# Patient Record
Sex: MALE | Race: WHITE | NOT HISPANIC OR LATINO | Employment: OTHER | URBAN - METROPOLITAN AREA
[De-identification: names, ages, dates, MRNs, and addresses within clinical notes are randomized per-mention and may not be internally consistent; named-entity substitution may affect disease eponyms.]

---

## 2018-09-12 ENCOUNTER — TELEPHONE (OUTPATIENT)
Dept: INTERNAL MEDICINE CLINIC | Facility: CLINIC | Age: 63
End: 2018-09-12

## 2018-09-12 NOTE — TELEPHONE ENCOUNTER
Patient called requesting an appt      He was last seen 2015, patient states he hasnt seen any other doctors since then  [phone]    Patients states he prefers Thursdays & Friday

## 2018-10-04 ENCOUNTER — TELEPHONE (OUTPATIENT)
Dept: INTERNAL MEDICINE CLINIC | Facility: CLINIC | Age: 63
End: 2018-10-04

## 2018-10-04 ENCOUNTER — OFFICE VISIT (OUTPATIENT)
Dept: INTERNAL MEDICINE CLINIC | Facility: CLINIC | Age: 63
End: 2018-10-04
Payer: COMMERCIAL

## 2018-10-04 VITALS
HEART RATE: 78 BPM | HEIGHT: 73 IN | WEIGHT: 180.4 LBS | RESPIRATION RATE: 14 BRPM | SYSTOLIC BLOOD PRESSURE: 118 MMHG | DIASTOLIC BLOOD PRESSURE: 72 MMHG | BODY MASS INDEX: 23.91 KG/M2

## 2018-10-04 DIAGNOSIS — E55.9 VITAMIN D DEFICIENCY: ICD-10-CM

## 2018-10-04 DIAGNOSIS — R53.83 FATIGUE, UNSPECIFIED TYPE: ICD-10-CM

## 2018-10-04 DIAGNOSIS — E78.5 HYPERLIPIDEMIA, UNSPECIFIED HYPERLIPIDEMIA TYPE: Chronic | ICD-10-CM

## 2018-10-04 DIAGNOSIS — N40.0 BENIGN PROSTATIC HYPERPLASIA, UNSPECIFIED WHETHER LOWER URINARY TRACT SYMPTOMS PRESENT: ICD-10-CM

## 2018-10-04 DIAGNOSIS — F32.A DEPRESSION, UNSPECIFIED DEPRESSION TYPE: Primary | Chronic | ICD-10-CM

## 2018-10-04 PROCEDURE — 99214 OFFICE O/P EST MOD 30 MIN: CPT | Performed by: INTERNAL MEDICINE

## 2018-10-04 RX ORDER — METHYLPHENIDATE HYDROCHLORIDE 10 MG/1
TABLET ORAL
Refills: 0 | COMMUNITY
Start: 2018-08-20 | End: 2022-05-24

## 2018-10-04 RX ORDER — DULOXETIN HYDROCHLORIDE 60 MG/1
90 CAPSULE, DELAYED RELEASE ORAL DAILY
Refills: 4 | COMMUNITY
Start: 2018-08-16 | End: 2022-05-24

## 2018-10-04 RX ORDER — METHYLPHENIDATE HYDROCHLORIDE 5 MG/1
TABLET ORAL
COMMUNITY
Start: 2016-08-09 | End: 2022-05-24

## 2018-10-04 RX ORDER — CLONAZEPAM 0.5 MG/1
TABLET ORAL
Refills: 2 | COMMUNITY
Start: 2018-08-16

## 2018-10-04 RX ORDER — SILDENAFIL CITRATE 50 MG
TABLET ORAL
Refills: 3 | COMMUNITY
Start: 2018-08-16

## 2018-10-04 RX ORDER — NORTRIPTYLINE HYDROCHLORIDE 25 MG/1
100 CAPSULE ORAL
COMMUNITY
End: 2021-04-05

## 2018-10-04 RX ORDER — VENLAFAXINE HYDROCHLORIDE 37.5 MG/1
CAPSULE, EXTENDED RELEASE ORAL
COMMUNITY
Start: 2016-07-18 | End: 2021-04-05

## 2018-10-04 NOTE — TELEPHONE ENCOUNTER
Pt is currently on baby aspirin and   wants to know if should continue on this  Please advise   Thanks     Pt tele: [phone]

## 2018-10-04 NOTE — PROGRESS NOTES
Assessment/Plan:  1  Health maintenance-will receive influenza vaccine outside the office  2  Health maintenance-has a prescription for Adacel vaccine-REVIEW NEXT VISIT  3 -health maintenance-reviewed Shingrix vaccine and patient has prescription  4  Long-term depression-severe  Present intermittently for years  Recently had major depression and required both ECT therapy as well as TMS-currently seeing physician in [address] [place] [address] in stable on Cymbalta and low-dose Abilify  5  Hyperlipidemia-mild-repeat labs at this point  6  Vitamin-D deficiency-for repeat level  7  BPH-minimal symptoms-labs ordered including  8  Seasonal hay fever-monitor  9  History of syncope-vasovagal  10  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-patient sees Dermatology  11  Health maintenance-had colonoscopy approximately 10 years ago-REVIEW NEXT VISIT    MEDICAL REGIMEN CYMBALTA 60 MILLIGRAMS DAILY, ABILIFY 5 MILLIGRAMS-HALF TABLET 3 DAYS PER WEEK,baby aspirin every other day, Cialis 20 milligrams-use as directed    Going for CBC, chemistry profile, PSA, vitamin-D level-appointment in 1 year    Addendum-received note from the patient having ongoing ED which is likely related to his meds  He is on Cymbalta as well as Abilify  Viagra helped to some degree  He wants to try Cialis  He was given a prescription for Cialis 20 milligrams use as directed  Addendum-patient called the office on September 30th stating is trying to get a card for medical marijuana but needs a letter -she wants to uses to treat anxiety  This is not typical therapy for anxiety and has potential for long-term issues  I was going to have a conversation with the patient but he then obtained the letter from his psychiatrist  No problem-specific Assessment & Plan notes found for this encounter         Diagnoses and all orders for this visit:    Depression, unspecified depression type    Hyperlipidemia, unspecified hyperlipidemia type    Other orders  -     VIAGRA 50 MG tablet;   -     aspirin (ASPIRIN LOW DOSE) 81 MG tablet; Take by mouth  -     Cholecalciferol 1000 units capsule; Take 1,000 Units by mouth  -     clonazePAM (KlonoPIN) 0 5 mg tablet;   -     DULoxetine (CYMBALTA) 60 mg delayed release capsule;   -     Omega-3 Fatty Acids (FISH OIL PO); Take 1 g by mouth  -     methylphenidate (RITALIN) 5 mg tablet; 1 in am  -     methylphenidate (RITALIN) 10 mg tablet; Earliest Fill Date: 8/20/18   -     nortriptyline (PAMELOR) 25 mg capsule; Take 100 mg by mouth  -     venlafaxine (EFFEXOR-XR) 37 5 mg 24 hr capsule; TAKE ONE CAPSULE BY MOUTH EVERY MORNING AND TAPER AS DIRECTED          Subjective:      Patient ID: Leoncio Wiley is a 58 y o  male  He has ongoing significant depression -she required ECT therapy as well as [address] [place] -he went through a proloned period of abnormalities= thyroid blood work was normal   He is currently seeing Dr dean in [facility] every couple months and stable on Cymbalta  He exercises on a regular basis  There is an extremely strong family history of depression  His sister has it  Apparent had  He feels that that is in the family of multiple members triggered his current events  He is currently on Cymbalta 60 milligrams daily and low-dose Abilify  He says all medical testing including thyroid blood work etc has been normal     He has a history of BP H  he has minimal symptoms  He does not feel the symptoms are not off warrant therapy  Prior studies had been reviewed and carotid Doppler done previously showed less than 50% stenosis bilaterally with PSA 1 1  LDL is 151  Prior testosterone was normal   He denies strong family history of vascular disease          The following portions of the patient's history were reviewed and updated as appropriate: current medications, past family history, past medical history, past social history, past surgical history and problem list     Review of Systems Constitutional: Negative  Respiratory: Negative  Cardiovascular: Negative  Gastrointestinal: Negative  Endocrine: Negative  Genitourinary: Negative  Musculoskeletal: Negative  Neurological: Negative  Hematological: Negative  Psychiatric/Behavioral: Positive for dysphoric mood  Objective:      /72   Pulse 78   Resp 14   Ht 6' 1" (1 854 m)   Wt 81 8 kg (180 lb 6 4 oz)   BMI 23 80 kg/m²          Physical Exam   Constitutional: He is oriented to person, place, and time  He appears well-developed and well-nourished  No distress  HENT:   Head: Normocephalic and atraumatic  Right Ear: External ear normal    Left Ear: External ear normal    Nose: Nose normal    Mouth/Throat: Oropharynx is clear and moist  No oropharyngeal exudate  Eyes: Pupils are equal, round, and reactive to light  Conjunctivae and EOM are normal  Right eye exhibits no discharge  Left eye exhibits no discharge  No scleral icterus  Neck: No JVD present  No tracheal deviation present  No thyromegaly present  Cardiovascular: Normal rate, regular rhythm, normal heart sounds and intact distal pulses  Exam reveals no gallop and no friction rub  No murmur heard  Pulmonary/Chest: Effort normal and breath sounds normal  No stridor  No respiratory distress  He has no wheezes  He exhibits no tenderness  Abdominal: Bowel sounds are normal  He exhibits no distension and no mass  There is no tenderness  There is no rebound and no guarding  Genitourinary: Rectum normal and penis normal  Rectal exam shows guaiac negative stool  Genitourinary Comments: Minimal enlargement of prostate without nodule   Musculoskeletal: Normal range of motion  He exhibits no edema, tenderness or deformity  Lymphadenopathy:     He has no cervical adenopathy  Neurological: He is alert and oriented to person, place, and time  He has normal reflexes  No cranial nerve deficit  He exhibits normal muscle tone   Coordination normal    Skin: Skin is warm and dry  No rash noted  He is not diaphoretic  No erythema  No pallor  Psychiatric: He has a normal mood and affect   His behavior is normal  Judgment and thought content normal

## 2018-12-11 ENCOUNTER — TELEPHONE (OUTPATIENT)
Dept: INTERNAL MEDICINE CLINIC | Facility: CLINIC | Age: 63
End: 2018-12-11

## 2018-12-11 NOTE — TELEPHONE ENCOUNTER
#[phone] can lm    Is patient following the same directions as he did with the Viagra ? Patient wanted to know about when to take it (right before , 30 minute etc)     Called script into [facility]

## 2018-12-11 NOTE — TELEPHONE ENCOUNTER
----- Message from Martha Gamez MD sent at 12/11/2018 12:37 PM EST -----  Patient dropped a note off at the office regarding her issues with erectile dysfunction and he wanted to know about trying Cialis-call in prescription for Cialis 20 milligrams-use as directed -dispense 6 with 4 refills

## 2019-01-21 ENCOUNTER — TELEPHONE (OUTPATIENT)
Dept: INTERNAL MEDICINE CLINIC | Facility: CLINIC | Age: 64
End: 2019-01-21

## 2019-01-21 NOTE — TELEPHONE ENCOUNTER
Called pharmacy and they said that they had already told him that his insurance doesn't cover Cialis but that they had already told him that Viagra would be cheaper at about $5 a pill  Called pt back to let him know but he didn't  so I left him a message

## 2019-03-27 ENCOUNTER — TELEPHONE (OUTPATIENT)
Dept: INTERNAL MEDICINE CLINIC | Facility: CLINIC | Age: 64
End: 2019-03-27

## 2019-03-27 NOTE — TELEPHONE ENCOUNTER
Pt needs you to write another script for him to get the shingrex vaccine  He lost the one you gave him at his last visit

## 2019-08-01 ENCOUNTER — TELEPHONE (OUTPATIENT)
Dept: INTERNAL MEDICINE CLINIC | Facility: CLINIC | Age: 64
End: 2019-08-01

## 2019-08-01 NOTE — TELEPHONE ENCOUNTER
PT CALLED BACK, I GAVE HIM THE NAMES OF THE DOCTORS, HE STATED THAT HE COULDN'T UNDERSTAND THE MESSAGE THAT WAS LEFT

## 2019-08-01 NOTE — TELEPHONE ENCOUNTER
Dr Gilda Driver wanted to know your recommendation on a [facility]'s doctor who specializes in hip replacement  He gave his cell phone if you want to text or call him  Or you can let me know and I can call him back  Please advise

## 2019-09-30 ENCOUNTER — TELEPHONE (OUTPATIENT)
Dept: INTERNAL MEDICINE CLINIC | Facility: CLINIC | Age: 64
End: 2019-09-30

## 2019-09-30 NOTE — TELEPHONE ENCOUNTER
PT STATED THAT HE IS TRYING TO GET A CARD FOR MEDICAL MARIJUANA BUT NEEDS A LETTER FROM YOU ABOUT HIM HAVING ANXIETY AND NEEDING IT   PLEASE ADVISE

## 2019-09-30 NOTE — TELEPHONE ENCOUNTER
I need to know if this was recommended by his psychiatrist and if he has had any prior treatment with it

## 2019-09-30 NOTE — TELEPHONE ENCOUNTER
SPOKE WITH PT, HE STATED THAT HIS PSYCHIATRIST DID NOT RECOMMEND THIS AND THAT HE'S BEEN ON KLONOPIN FOR IT

## 2019-10-01 NOTE — TELEPHONE ENCOUNTER
I have not written a letter- I need to speak to him-find out times he is available over the next several days

## 2019-10-02 NOTE — TELEPHONE ENCOUNTER
SPOKE WITH PT TO GET TIMES AND HE TOLD ME THAT HE HAD HIS PSYCHIATRIST WRITE HIM A LETTER SO IT'S ALL TAKEN CARE OF

## 2019-10-05 LAB
25(OH)D3 SERPL-MCNC: 27 NG/ML (ref 30–100)
ALBUMIN SERPL-MCNC: 4 G/DL (ref 3.6–5.1)
ALBUMIN/GLOB SERPL: 1.7 (CALC) (ref 1–2.5)
ALP SERPL-CCNC: 50 U/L (ref 40–115)
ALT SERPL-CCNC: 10 U/L (ref 9–46)
AST SERPL-CCNC: 16 U/L (ref 10–35)
BASOPHILS # BLD AUTO: 33 CELLS/UL (ref 0–200)
BASOPHILS NFR BLD AUTO: 0.5 %
BILIRUB SERPL-MCNC: 0.5 MG/DL (ref 0.2–1.2)
BUN SERPL-MCNC: 21 MG/DL (ref 7–25)
BUN/CREAT SERPL: ABNORMAL (CALC) (ref 6–22)
CALCIUM SERPL-MCNC: 9.3 MG/DL (ref 8.6–10.3)
CHLORIDE SERPL-SCNC: 106 MMOL/L (ref 98–110)
CHOLEST SERPL-MCNC: 213 MG/DL
CO2 SERPL-SCNC: 32 MMOL/L (ref 20–32)
CREAT SERPL-MCNC: 0.97 MG/DL (ref 0.7–1.25)
EOSINOPHIL # BLD AUTO: 254 CELLS/UL (ref 15–500)
EOSINOPHIL NFR BLD AUTO: 3.9 %
ERYTHROCYTE [DISTWIDTH] IN BLOOD BY AUTOMATED COUNT: 12.8 % (ref 11–15)
GLOBULIN SER CALC-MCNC: 2.3 G/DL (CALC) (ref 1.9–3.7)
GLUCOSE SERPL-MCNC: 104 MG/DL (ref 65–99)
HCT VFR BLD AUTO: 44 % (ref 38.5–50)
HDLC SERPL-MCNC: 39 MG/DL
HGB BLD-MCNC: 14.2 G/DL (ref 13.2–17.1)
LDLC SERPL DIRECT ASSAY-MCNC: 149 MG/DL
LYMPHOCYTES # BLD AUTO: 1638 CELLS/UL (ref 850–3900)
LYMPHOCYTES NFR BLD AUTO: 25.2 %
MCH RBC QN AUTO: 29.6 PG (ref 27–33)
MCHC RBC AUTO-ENTMCNC: 32.3 G/DL (ref 32–36)
MCV RBC AUTO: 91.7 FL (ref 80–100)
MONOCYTES # BLD AUTO: 605 CELLS/UL (ref 200–950)
MONOCYTES NFR BLD AUTO: 9.3 %
NEUTROPHILS # BLD AUTO: 3972 CELLS/UL (ref 1500–7800)
NEUTROPHILS NFR BLD AUTO: 61.1 %
PLATELET # BLD AUTO: 226 THOUSAND/UL (ref 140–400)
PMV BLD REES-ECKER: 10 FL (ref 7.5–12.5)
POTASSIUM SERPL-SCNC: 4.4 MMOL/L (ref 3.5–5.3)
PROT SERPL-MCNC: 6.3 G/DL (ref 6.1–8.1)
PSA SERPL-MCNC: 1.6 NG/ML
RBC # BLD AUTO: 4.8 MILLION/UL (ref 4.2–5.8)
SL AMB EGFR AFRICAN AMERICAN: 96 ML/MIN/1.73M2
SL AMB EGFR NON AFRICAN AMERICAN: 83 ML/MIN/1.73M2
SODIUM SERPL-SCNC: 143 MMOL/L (ref 135–146)
TRIGL SERPL-MCNC: 128 MG/DL
WBC # BLD AUTO: 6.5 THOUSAND/UL (ref 3.8–10.8)

## 2019-10-10 ENCOUNTER — OFFICE VISIT (OUTPATIENT)
Dept: INTERNAL MEDICINE CLINIC | Facility: CLINIC | Age: 64
End: 2019-10-10
Payer: COMMERCIAL

## 2019-10-10 VITALS
BODY MASS INDEX: 24.09 KG/M2 | SYSTOLIC BLOOD PRESSURE: 110 MMHG | HEIGHT: 73 IN | DIASTOLIC BLOOD PRESSURE: 70 MMHG | HEART RATE: 78 BPM | WEIGHT: 181.8 LBS | RESPIRATION RATE: 14 BRPM

## 2019-10-10 DIAGNOSIS — R73.9 HYPERGLYCEMIA: ICD-10-CM

## 2019-10-10 DIAGNOSIS — E55.9 VITAMIN D DEFICIENCY: ICD-10-CM

## 2019-10-10 DIAGNOSIS — N40.0 BENIGN PROSTATIC HYPERPLASIA WITHOUT LOWER URINARY TRACT SYMPTOMS: ICD-10-CM

## 2019-10-10 DIAGNOSIS — E78.5 HYPERLIPIDEMIA, UNSPECIFIED HYPERLIPIDEMIA TYPE: Chronic | ICD-10-CM

## 2019-10-10 DIAGNOSIS — F32.A DEPRESSION, UNSPECIFIED DEPRESSION TYPE: Primary | Chronic | ICD-10-CM

## 2019-10-10 PROCEDURE — 99214 OFFICE O/P EST MOD 30 MIN: CPT | Performed by: INTERNAL MEDICINE

## 2019-10-10 PROCEDURE — 3008F BODY MASS INDEX DOCD: CPT | Performed by: INTERNAL MEDICINE

## 2019-10-10 NOTE — PROGRESS NOTES
Assessment/Plan:   1  Health maintenance -receiving influenza vaccine  2  Health maintenance -has prescription for Tdap vaccine  3  Health maintenance -has prescription for Shingrix vaccine  4  Hyperglycemia -mild-A1c normal   He is aware blood sugar can be influenced by his use of Abilify  5  Hyperlipidemia -no other risk factors other than brief smoking in the remote past   No family history  He wants to intensify conservative therapy  He is aware this could also be influenced by Abilify  6  Long-term depression -severe - see Psychiatry to regular basis  In the past required both ECT therapy  Currently on therapy with transcranial magnetic stimulation -he sees Dr Chela Ramos in [facility] a remains on Cymbalta at low-dose Abilify  7  Vitamin-D deficiency -she will initiate 2000 units daily  8  BPH-minimal symptoms -monitor  9  Seasonal hay fever-monitor  10  History of syncope -vasovagal  11  General care -she will be scheduled follow-up colonoscopy     medical regimen -Cymbalta 60 milligrams daily, Abilify 5 milligrams daily, baby aspirin every other day, Cialis 20 milligrams -use as directed , vitamin D3/ 2000 units a day    Appointment in 1 year with prior chemistry profile, CBC, cholesterol profile, vitamin-D level, PSA  No problem-specific Assessment & Plan notes found for this encounter  Diagnoses and all orders for this visit:    Depression, unspecified depression type    Hyperlipidemia, unspecified hyperlipidemia type  -     CBC and differential; Future  -     Comprehensive metabolic panel; Future  -     Cholesterol, total; Future  -     HEMOGLOBIN A1C W/ EAG ESTIMATION; Future  -     HDL cholesterol; Future  -     LDL cholesterol, direct; Future  -     Triglycerides; Future  -     PSA Total, Diagnostic; Future  -     Vitamin D 25 hydroxy; Future    Hyperglycemia  -     CBC and differential; Future  -     Comprehensive metabolic panel;  Future  -     Cholesterol, total; Future  -     HEMOGLOBIN A1C W/ EAG ESTIMATION; Future  -     HDL cholesterol; Future  -     LDL cholesterol, direct; Future  -     Triglycerides; Future  -     PSA Total, Diagnostic; Future  -     Vitamin D 25 hydroxy; Future    Vitamin D deficiency  -     Vitamin D 25 hydroxy; Future    Benign prostatic hyperplasia without lower urinary tract symptoms  -     PSA Total, Diagnostic; Future          Subjective:      Patient ID: Tg Burgos is a 61 y o  male  Reviewed several health maintenance issues  He is due for follow-up colonoscopy  Reviewed the literature regarding cologuard colonoscopy and he will be proceeding with colonoscopy  Donald Phillip He will be receiving influenza vaccine  He has a prescription for Tdap  He will also be receiving the zoster vaccine  He continues to struggle with his depression  He remains on duloxetine plus Abilify  He says it is partially effective  He is still being treated with [address] [place]- he is due to complete excellent  He sees a psychiatrist every several weeks      Results for orders placed or performed in visit on 10/04/19  -LDL cholesterol, direct       Result                      Value             Ref Range           LDL Cholesterol             149 (H)           <100 mg/dL     -Cholesterol, total       Result                      Value             Ref Range           Total Cholesterol           213 (H)           <200 mg/dL     -Triglycerides       Result                      Value             Ref Range           Triglycerides               128               <150 mg/dL     -Comprehensive metabolic panel       Result                      Value             Ref Range           Glucose, Random             104 (H)           65 - 99 mg/dL       BUN                         21                7 - 25 mg/dL        Creatinine                  0 97              0 70 - 1 25 *       eGFR Non  Ameri*     83                > OR = 60 mL*       eGFR        96                > OR = 60 mL*       SL AMB BUN/CREATININE *                       6 - 22 (calc)   NOT APPLICABLE       Sodium                      143               135 - 146 mm*       Potassium                   4 4               3 5 - 5 3 mm*       Chloride                    106               98 - 110 mmo*       CO2                         32                20 - 32 mmol*       SL AMB CALCIUM              9 3               8 6 - 10 3 m*       Protein, Total              6 3               6 1 - 8 1 g/*       Albumin                     4 0               3 6 - 5 1 g/*       Globulin                    2 3               1 9 - 3 7 g/*       Albumin/Globulin Ratio      1 7               1 0 - 2 5 (c*       TOTAL BILIRUBIN             0 5               0 2 - 1 2 mg*       Alkaline Phosphatase        50                40 - 115 U/L        AST                         16                10 - 35 U/L         ALT                         10                9 - 46 U/L     -HDL cholesterol       Result                      Value             Ref Range           HDL                         39 (L)            >40 mg/dL      -CBC and differential       Result                      Value             Ref Range           White Blood Cell Count      6 5               3 8 - 10 8 T*       Red Blood Cell Count        4 80              4 20 - 5 80 *       Hemoglobin                  14 2              13 2 - 17 1 *       HCT                         44 0              38 5 - 50 0 %       MCV                         91 7              80 0 - 100 0*       MCH                         29 6              27 0 - 33 0 *       MCHC                        32 3              32 0 - 36 0 *       RDW                         12 8              11 0 - 15 0 %       Platelet Count              226               140 - 400 Th*       SL AMB MPV                  10 0              7 5 - 12 5 fL       Neutrophils (Absolute)      3,972             1,500 - 7,80*       Lymphocytes (Absolute)      1,638             850 - 3,900 *       Monocytes (Absolute)        605               200 - 950 ce*       Eosinophils (Absolute)      254               15 - 500 boris*       Basophils ABS               33                0 - 200 cell*       Neutrophils                 61 1              %                   Lymphocytes                 25 2              %                   Monocytes                   9 3               %                   Eosinophils                 3 9               %                   Basophils PCT               0 5               %              -PSA, Total Screen       Result                      Value             Ref Range           Prostate Specific Anti*     1 6               < OR = 4 0 n*  -Vitamin D 25 hydroxy       Result                      Value             Ref Range           Vitamin D, 25-Hydroxy,*     27 (L)            30 - 100 ng/*    He has mild hyperglycemia but an A1c done in the office is normal   In reference to his hyperlipidemia he was a smoker the remote past for 8 years  Denies any family history of vascular disease  He is trying to intensify conservative measures  We reviewed that use of Abilify can affect his lipids as well as his blood sugar  He sees Dermatology periodically with his history of skin lesions  He had recent full skin exam    In reference to his vitamin-D deficiency he will begin vitamin D3/ 2000 units a day  This patient denies any systemic symptoms  Specifically there has been no evidence of fever, night sweats, significant weight loss or significant decrease in appetite  collected 0 8 with hemanth leone      The following portions of the patient's history were reviewed and updated as appropriate: allergies, current medications, past family history, past medical history, past social history, past surgical history and problem list     Review of Systems   Constitutional: Negative  Respiratory: Negative  Cardiovascular: Negative  Gastrointestinal: Negative      Endocrine: Negative  Genitourinary: Negative  Musculoskeletal: Negative  Neurological: Negative  Hematological: Negative  Psychiatric/Behavioral: Positive for dysphoric mood  Objective:      Ht 6' 1" (1 854 m)   Wt 82 5 kg (181 lb 12 8 oz)   BMI 23 99 kg/m²          Physical Exam   Constitutional: He is oriented to person, place, and time  He appears well-developed and well-nourished  No distress  HENT:   Head: Normocephalic and atraumatic  Right Ear: External ear normal    Left Ear: External ear normal    Nose: Nose normal    Mouth/Throat: Oropharynx is clear and moist  No oropharyngeal exudate  Eyes: Pupils are equal, round, and reactive to light  Conjunctivae and EOM are normal  Right eye exhibits no discharge  Left eye exhibits no discharge  No scleral icterus  Neck: No JVD present  No tracheal deviation present  No thyromegaly present  Cardiovascular: Normal rate, regular rhythm, normal heart sounds and intact distal pulses  Exam reveals no gallop and no friction rub  No murmur heard  Pulmonary/Chest: Effort normal and breath sounds normal  No stridor  No respiratory distress  He has no wheezes  He exhibits no tenderness  Abdominal: Bowel sounds are normal  He exhibits no distension and no mass  There is no tenderness  There is no rebound and no guarding  Genitourinary: Rectum normal, prostate normal and penis normal  Rectal exam shows guaiac negative stool  Musculoskeletal: Normal range of motion  He exhibits no edema, tenderness or deformity  Lymphadenopathy:     He has no cervical adenopathy  Neurological: He is alert and oriented to person, place, and time  He has normal reflexes  He displays normal reflexes  No cranial nerve deficit  He exhibits normal muscle tone  Coordination normal    Skin: Skin is warm and dry  No rash noted  He is not diaphoretic  No erythema  No pallor  Psychiatric: He has a normal mood and affect   His behavior is normal  Judgment and thought content normal

## 2020-12-28 ENCOUNTER — IMMUNIZATIONS (OUTPATIENT)
Dept: FAMILY MEDICINE CLINIC | Facility: HOSPITAL | Age: 65
End: 2020-12-28
Payer: COMMERCIAL

## 2020-12-28 DIAGNOSIS — Z23 ENCOUNTER FOR IMMUNIZATION: ICD-10-CM

## 2020-12-28 PROCEDURE — 91301 SARS-COV-2 / COVID-19 MRNA VACCINE (MODERNA) 100 MCG: CPT

## 2020-12-28 PROCEDURE — 0011A SARS-COV-2 / COVID-19 MRNA VACCINE (MODERNA) 100 MCG: CPT

## 2021-01-24 ENCOUNTER — IMMUNIZATIONS (OUTPATIENT)
Dept: FAMILY MEDICINE CLINIC | Facility: HOSPITAL | Age: 66
End: 2021-01-24

## 2021-01-24 DIAGNOSIS — Z23 ENCOUNTER FOR IMMUNIZATION: Primary | ICD-10-CM

## 2021-01-24 PROCEDURE — 0012A SARS-COV-2 / COVID-19 MRNA VACCINE (MODERNA) 100 MCG: CPT

## 2021-01-24 PROCEDURE — 91301 SARS-COV-2 / COVID-19 MRNA VACCINE (MODERNA) 100 MCG: CPT

## 2021-04-05 ENCOUNTER — OFFICE VISIT (OUTPATIENT)
Dept: INTERNAL MEDICINE CLINIC | Facility: CLINIC | Age: 66
End: 2021-04-05
Payer: COMMERCIAL

## 2021-04-05 VITALS
OXYGEN SATURATION: 98 % | DIASTOLIC BLOOD PRESSURE: 68 MMHG | RESPIRATION RATE: 18 BRPM | WEIGHT: 177 LBS | BODY MASS INDEX: 23.98 KG/M2 | TEMPERATURE: 98 F | HEART RATE: 75 BPM | SYSTOLIC BLOOD PRESSURE: 132 MMHG | HEIGHT: 72 IN

## 2021-04-05 DIAGNOSIS — I65.23 CAROTID STENOSIS, ASYMPTOMATIC, BILATERAL: ICD-10-CM

## 2021-04-05 DIAGNOSIS — F32.A DEPRESSION, UNSPECIFIED DEPRESSION TYPE: Primary | ICD-10-CM

## 2021-04-05 DIAGNOSIS — Z13.1 SCREENING FOR DIABETES MELLITUS: ICD-10-CM

## 2021-04-05 DIAGNOSIS — N40.0 BENIGN PROSTATIC HYPERPLASIA WITHOUT LOWER URINARY TRACT SYMPTOMS: ICD-10-CM

## 2021-04-05 DIAGNOSIS — Z13.29 SCREENING FOR THYROID DISORDER: ICD-10-CM

## 2021-04-05 DIAGNOSIS — Z13.220 SCREENING FOR HYPERLIPIDEMIA: ICD-10-CM

## 2021-04-05 DIAGNOSIS — E78.5 HYPERLIPIDEMIA, UNSPECIFIED HYPERLIPIDEMIA TYPE: ICD-10-CM

## 2021-04-05 DIAGNOSIS — E55.9 VITAMIN D DEFICIENCY: ICD-10-CM

## 2021-04-05 PROCEDURE — 99214 OFFICE O/P EST MOD 30 MIN: CPT | Performed by: INTERNAL MEDICINE

## 2021-04-05 PROCEDURE — 3725F SCREEN DEPRESSION PERFORMED: CPT | Performed by: INTERNAL MEDICINE

## 2021-04-05 PROCEDURE — 3288F FALL RISK ASSESSMENT DOCD: CPT | Performed by: INTERNAL MEDICINE

## 2021-04-05 PROCEDURE — 1160F RVW MEDS BY RX/DR IN RCRD: CPT | Performed by: INTERNAL MEDICINE

## 2021-04-05 PROCEDURE — 1101F PT FALLS ASSESS-DOCD LE1/YR: CPT | Performed by: INTERNAL MEDICINE

## 2021-04-05 PROCEDURE — 1036F TOBACCO NON-USER: CPT | Performed by: INTERNAL MEDICINE

## 2021-04-05 PROCEDURE — 3008F BODY MASS INDEX DOCD: CPT | Performed by: INTERNAL MEDICINE

## 2021-04-05 RX ORDER — LAMOTRIGINE 100 MG/1
100 TABLET ORAL DAILY
COMMUNITY
End: 2022-05-24

## 2021-04-05 RX ORDER — ARIPIPRAZOLE 5 MG/1
2.5 TABLET ORAL DAILY
COMMUNITY
End: 2022-05-24

## 2021-04-05 NOTE — PROGRESS NOTES
Assessment/Plan:    #Depression  -seeing psychiatry Dr Margi Raphale  -no on lamictal with plans to possibly wean off abilify, klonopin, ritalin, cymbalta  -previous history of transcranial magnetic stimulation and ECT therapy, reports he felt foggy minded and forgetful after ECT    #Vitamin D Deficiency  -no longer taking vitamin D    #BPH  -has urinary urgency but denies nocturia or frequency    #Family History  -mother with HLD  -patient on aspirin  -awaiting labs  -obtain carotid US    #Erectile Dysfunction  -uses viagra prn    #Basal Cell  -seeing dermatology for annual skin check    #Health Maintenance  -routine labs nad followup 6 months  -colonoscopy with Dr Theodore Cardozo pending  -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  -covid vaccine up to date  -will obtain TDAP and shingrix at [place] [id number] 4/28/22 PSA elevated at 4 4, repeat 5 and saw urology at [facility]  Will start on ibuprofen for possible prostatis and repeat 4kscore in 4 weeks and if abnormal then MRI prostate  No problem-specific Assessment & Plan notes found for this encounter         Diagnoses and all orders for this visit:    Depression, unspecified depression type  -     CBC and differential  -     Comprehensive metabolic panel    Hyperlipidemia, unspecified hyperlipidemia type  -     CBC and differential  -     Comprehensive metabolic panel  -     Lipid Panel With Direct LDL    Benign prostatic hyperplasia without lower urinary tract symptoms  -     CBC and differential  -     Comprehensive metabolic panel  -     PSA, total and free    Vitamin D deficiency  -     CBC and differential  -     Comprehensive metabolic panel  -     Vitamin D 25 hydroxy    Screening for diabetes mellitus  -     CBC and differential  -     Hemoglobin A1C  -     Comprehensive metabolic panel    Screening for hyperlipidemia  -     CBC and differential  -     Comprehensive metabolic panel    Screening for thyroid disorder  -     CBC and differential  -     TSH, 3rd generation with Free T4 reflex  -     Comprehensive metabolic panel    Carotid stenosis, asymptomatic, bilateral  -     CBC and differential  -     Comprehensive metabolic panel  -     VAS carotid complete study; Future    Other orders  -     lamoTRIgine (LaMICtal) 100 mg tablet; Take 100 mg by mouth daily  -     ARIPiprazole (ABILIFY) 5 mg tablet; Take 2 5 mg by mouth daily            Current Outpatient Medications:     ARIPiprazole (ABILIFY) 5 mg tablet, Take 2 5 mg by mouth daily, Disp: , Rfl:     aspirin (ASPIRIN LOW DOSE) 81 MG tablet, Take 1 tablet by mouth 3 (three) times a week, Disp: , Rfl:     clonazePAM (KlonoPIN) 0 5 mg tablet, , Disp: , Rfl: 2    DULoxetine (CYMBALTA) 60 mg delayed release capsule, Take 90 mg by mouth daily, Disp: , Rfl: 4    lamoTRIgine (LaMICtal) 100 mg tablet, Take 100 mg by mouth daily, Disp: , Rfl:     methylphenidate (RITALIN) 10 mg tablet, , Disp: , Rfl: 0    methylphenidate (RITALIN) 5 mg tablet, 1 in am, Disp: , Rfl:     VIAGRA 50 MG tablet, , Disp: , Rfl: 3    Subjective:      Patient ID: Beryle Grayer is a 72 y o  male  HPI    Patient presents for routine checkup  Denies any recent hospitalizations or surgeries  States that he continues to follow-up with psychiatry for depression  He is currently taking Abilify, Cymbalta, Lamictal, methylphenidate  He is due for colonoscopy and will schedule  He has BPH with urinary urgency  He defers starting any alpha blockers  We will obtain routine labs today  He is due for the Tdap and Shingrix vaccine however he will obtain it at [facility]  He reports a family history of mother with hyperlipidemia however denies any coronary artery disease or stroke  He continues to take aspirin  We will obtain a carotid ultrasound  He will call back if he is interested in an exercise stress test   Patient will return to care in 1 year      The following portions of the patient's history were reviewed and updated as appropriate: allergies, current medications, past family history, past medical history, past social history, past surgical history and problem list     Review of Systems   Constitutional: Negative for activity change, appetite change, fatigue and fever  HENT: Negative for congestion, ear pain, hearing loss, sore throat and tinnitus  Eyes: Negative for photophobia, pain and visual disturbance  Respiratory: Negative for cough, shortness of breath and wheezing  Cardiovascular: Negative for chest pain, palpitations and leg swelling  Gastrointestinal: Negative for abdominal distention, abdominal pain, constipation, diarrhea, nausea and vomiting  Genitourinary: Negative for difficulty urinating, frequency and hematuria  Musculoskeletal: Negative for arthralgias, back pain, gait problem, joint swelling, myalgias, neck pain and neck stiffness  Skin: Negative for color change, pallor, rash and wound  Neurological: Negative for dizziness, tremors, numbness and headaches  Hematological: Does not bruise/bleed easily  Psychiatric/Behavioral: Positive for decreased concentration and dysphoric mood  Objective:      /68 (BP Location: Left arm, Patient Position: Sitting, Cuff Size: Standard)   Pulse 75   Temp 98 °F (36 7 °C) (Tympanic)   Resp 18   Ht 6' (1 829 m)   Wt 80 3 kg (177 lb)   SpO2 98%   BMI 24 01 kg/m²          Physical Exam  Vitals signs reviewed  Constitutional:       Appearance: He is well-developed  HENT:      Head: Normocephalic and atraumatic  Right Ear: Tympanic membrane, ear canal and external ear normal  There is no impacted cerumen  Left Ear: Tympanic membrane, ear canal and external ear normal  There is no impacted cerumen  Eyes:      Conjunctiva/sclera: Conjunctivae normal       Pupils: Pupils are equal, round, and reactive to light  Neck:      Musculoskeletal: Normal range of motion and neck supple        Thyroid: No thyromegaly  Vascular: No JVD  Cardiovascular:      Rate and Rhythm: Normal rate and regular rhythm  Pulses: Normal pulses  Heart sounds: Normal heart sounds  No murmur  Pulmonary:      Effort: Pulmonary effort is normal  No respiratory distress  Breath sounds: Normal breath sounds  No stridor  No wheezing, rhonchi or rales  Abdominal:      General: Bowel sounds are normal  There is no distension  Palpations: Abdomen is soft  There is no mass  Tenderness: There is no abdominal tenderness  There is no right CVA tenderness, left CVA tenderness, guarding or rebound  Musculoskeletal: Normal range of motion  General: No tenderness or deformity  Right lower leg: No edema  Left lower leg: No edema  Lymphadenopathy:      Cervical: No cervical adenopathy  Skin:     General: Skin is warm and dry  Findings: No erythema or rash  Neurological:      Mental Status: He is alert and oriented to person, place, and time  Deep Tendon Reflexes: Reflexes are normal and symmetric  This note was completed in part utilizing [employer] direct voice recognition software  Grammatical errors, random word insertion, spelling mistakes, and incomplete sentences may be an occasional consequence of the system secondary to software limitations, ambient noise and hardware issues  At the time of dictation, efforts were made to edit, clarify and /or correct errors  Please read the chart carefully and recognize, using context, where substitutions have occurred  If you have any questions or concerns about the context, text or information contained within the body of this dictation, please contact myself, the provider, for further clarification

## 2021-04-20 ENCOUNTER — HOSPITAL ENCOUNTER (OUTPATIENT)
Dept: NON INVASIVE DIAGNOSTICS | Facility: HOSPITAL | Age: 66
Discharge: HOME | End: 2021-04-20
Payer: COMMERCIAL

## 2021-04-20 ENCOUNTER — APPOINTMENT (OUTPATIENT)
Dept: LAB | Facility: HOSPITAL | Age: 66
End: 2021-04-20
Payer: COMMERCIAL

## 2021-04-20 DIAGNOSIS — F03.90 PRESENILE DEMENTIA WITH DEPRESSION WITHOUT BEHAVIORAL DISTURBANCE (HCC): Primary | ICD-10-CM

## 2021-04-20 DIAGNOSIS — I65.23 CAROTID STENOSIS, ASYMPTOMATIC, BILATERAL: ICD-10-CM

## 2021-04-20 DIAGNOSIS — F32.A PRESENILE DEMENTIA WITH DEPRESSION WITHOUT BEHAVIORAL DISTURBANCE (HCC): Primary | ICD-10-CM

## 2021-04-20 LAB
25(OH)D3 SERPL-MCNC: 27.3 NG/ML (ref 30–100)
ALBUMIN SERPL BCP-MCNC: 4 G/DL (ref 3.5–5)
ALP SERPL-CCNC: 67 U/L (ref 46–116)
ALT SERPL W P-5'-P-CCNC: 16 U/L (ref 12–78)
ANION GAP SERPL CALCULATED.3IONS-SCNC: 7 MMOL/L (ref 4–13)
AST SERPL W P-5'-P-CCNC: 16 U/L (ref 5–45)
BASOPHILS # BLD AUTO: 0.02 THOUSANDS/ΜL (ref 0–0.1)
BASOPHILS NFR BLD AUTO: 0 % (ref 0–1)
BILIRUB SERPL-MCNC: 0.43 MG/DL (ref 0.2–1)
BUN SERPL-MCNC: 18 MG/DL (ref 5–25)
CALCIUM SERPL-MCNC: 9.2 MG/DL (ref 8.3–10.1)
CHLORIDE SERPL-SCNC: 105 MMOL/L (ref 100–108)
CHOLEST SERPL-MCNC: 198 MG/DL (ref 50–200)
CO2 SERPL-SCNC: 31 MMOL/L (ref 21–32)
CREAT SERPL-MCNC: 1.06 MG/DL (ref 0.6–1.3)
EOSINOPHIL # BLD AUTO: 0.13 THOUSAND/ΜL (ref 0–0.61)
EOSINOPHIL NFR BLD AUTO: 2 % (ref 0–6)
ERYTHROCYTE [DISTWIDTH] IN BLOOD BY AUTOMATED COUNT: 12.6 % (ref 11.6–15.1)
EST. AVERAGE GLUCOSE BLD GHB EST-MCNC: 114 MG/DL
GFR SERPL CREATININE-BSD FRML MDRD: 73 ML/MIN/1.73SQ M
GLUCOSE SERPL-MCNC: 97 MG/DL (ref 65–140)
HBA1C MFR BLD: 5.6 %
HCT VFR BLD AUTO: 44.2 % (ref 36.5–49.3)
HDLC SERPL-MCNC: 43 MG/DL
HGB BLD-MCNC: 14.4 G/DL (ref 12–17)
IMM GRANULOCYTES # BLD AUTO: 0.02 THOUSAND/UL (ref 0–0.2)
IMM GRANULOCYTES NFR BLD AUTO: 0 % (ref 0–2)
LDLC SERPL CALC-MCNC: 135 MG/DL (ref 0–100)
LYMPHOCYTES # BLD AUTO: 1.39 THOUSANDS/ΜL (ref 0.6–4.47)
LYMPHOCYTES NFR BLD AUTO: 22 % (ref 14–44)
MCH RBC QN AUTO: 29.8 PG (ref 26.8–34.3)
MCHC RBC AUTO-ENTMCNC: 32.6 G/DL (ref 31.4–37.4)
MCV RBC AUTO: 92 FL (ref 82–98)
MONOCYTES # BLD AUTO: 0.5 THOUSAND/ΜL (ref 0.17–1.22)
MONOCYTES NFR BLD AUTO: 8 % (ref 4–12)
NEUTROPHILS # BLD AUTO: 4.23 THOUSANDS/ΜL (ref 1.85–7.62)
NEUTS SEG NFR BLD AUTO: 68 % (ref 43–75)
NRBC BLD AUTO-RTO: 0 /100 WBCS
PLATELET # BLD AUTO: 239 THOUSANDS/UL (ref 149–390)
PMV BLD AUTO: 9.3 FL (ref 8.9–12.7)
POTASSIUM SERPL-SCNC: 4.8 MMOL/L (ref 3.5–5.3)
PROT SERPL-MCNC: 6.6 G/DL (ref 6.4–8.2)
RBC # BLD AUTO: 4.83 MILLION/UL (ref 3.88–5.62)
SODIUM SERPL-SCNC: 143 MMOL/L (ref 136–145)
TRIGL SERPL-MCNC: 101 MG/DL
TSH SERPL DL<=0.05 MIU/L-ACNC: 2.32 UIU/ML (ref 0.36–3.74)
WBC # BLD AUTO: 6.29 THOUSAND/UL (ref 4.31–10.16)

## 2021-04-20 PROCEDURE — 36415 COLL VENOUS BLD VENIPUNCTURE: CPT | Performed by: INTERNAL MEDICINE

## 2021-04-20 PROCEDURE — 83036 HEMOGLOBIN GLYCOSYLATED A1C: CPT | Performed by: INTERNAL MEDICINE

## 2021-04-20 PROCEDURE — 82306 VITAMIN D 25 HYDROXY: CPT | Performed by: INTERNAL MEDICINE

## 2021-04-20 PROCEDURE — 85025 COMPLETE CBC W/AUTO DIFF WBC: CPT | Performed by: INTERNAL MEDICINE

## 2021-04-20 PROCEDURE — 84153 ASSAY OF PSA TOTAL: CPT | Performed by: INTERNAL MEDICINE

## 2021-04-20 PROCEDURE — 93880 EXTRACRANIAL BILAT STUDY: CPT

## 2021-04-20 PROCEDURE — 80053 COMPREHEN METABOLIC PANEL: CPT | Performed by: INTERNAL MEDICINE

## 2021-04-20 PROCEDURE — 93880 EXTRACRANIAL BILAT STUDY: CPT | Performed by: SURGERY

## 2021-04-20 PROCEDURE — 84443 ASSAY THYROID STIM HORMONE: CPT | Performed by: INTERNAL MEDICINE

## 2021-04-20 PROCEDURE — 80061 LIPID PANEL: CPT

## 2021-04-20 PROCEDURE — 84154 ASSAY OF PSA FREE: CPT | Performed by: INTERNAL MEDICINE

## 2021-04-21 ENCOUNTER — TELEPHONE (OUTPATIENT)
Dept: INTERNAL MEDICINE CLINIC | Facility: CLINIC | Age: 66
End: 2021-04-21

## 2021-04-21 DIAGNOSIS — E78.5 HYPERLIPIDEMIA, UNSPECIFIED HYPERLIPIDEMIA TYPE: Primary | ICD-10-CM

## 2021-04-21 NOTE — TELEPHONE ENCOUNTER
LEFT DETAILED MESSAGE FOR PT, TOLD HIM TO CALL BACK IF HE WANTED TO TALK ABOUT STARTING A CHOLESTEROL MEDICATION OR IF HE HAS ANY QUESTIONS    I ALSO LET HIM KNOW THAT I PUT THE LABS LIP IN THE MAIL TO BE DONE NEXT YEAR

## 2021-04-21 NOTE — TELEPHONE ENCOUNTER
----- Message from Dario Hyatt DO sent at 4/21/2021  2:13 PM EDT -----  Please let Dr Tung Wolf know that his labs were good except for the cholesterol  His LDL is 135  He needs to diet and exercise to bring this below 100  We can consider statin medication if he is interested  His carotid US reveals <50% stenosis over b/l   carotids but the plaque is smooth and can be monitored off a statin if he prefers  We will continue to monitor his lipids next year  His vitamin D was low at 27, he should take 2000 units vitamin D daily

## 2021-04-21 NOTE — TELEPHONE ENCOUNTER
Voicemail message left for theresa informing him that cartoid US exact percentages below 50 are not listed  Vascular reports the percent as less than 50 and that it is the same as in 2015  He will continue with aspirin

## 2021-04-21 NOTE — TELEPHONE ENCOUNTER
PT CALLED BACK, SAID HE'D LIKE TO SPEAK WITH YOU TO GET MORE DETAILS ON HIS CAROTID   HE'S ASKING FOR THE EXACT PERCENTAGE     PLEASE ADVISE

## 2021-04-22 LAB
PSA FREE MFR SERPL: 27.6 %
PSA FREE SERPL-MCNC: 0.58 NG/ML
PSA SERPL-MCNC: 2.1 NG/ML (ref 0–4)

## 2022-03-04 ENCOUNTER — TELEPHONE (OUTPATIENT)
Dept: DERMATOLOGY | Facility: CLINIC | Age: 67
End: 2022-03-04

## 2022-03-04 NOTE — TELEPHONE ENCOUNTER
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

## 2022-04-01 ENCOUNTER — TELEPHONE (OUTPATIENT)
Dept: INTERNAL MEDICINE CLINIC | Facility: CLINIC | Age: 67
End: 2022-04-01

## 2022-04-01 NOTE — TELEPHONE ENCOUNTER
Called pt to confirm mondays appt but he r/s due to no labs being done and he goes to [facility]  He wants to know if you can add PSA, vitamin D to his labs  -I will mail out his lab slip altogether

## 2022-04-19 ENCOUNTER — TELEPHONE (OUTPATIENT)
Dept: INTERNAL MEDICINE CLINIC | Facility: CLINIC | Age: 67
End: 2022-04-19

## 2022-04-22 ENCOUNTER — APPOINTMENT (OUTPATIENT)
Dept: LAB | Facility: HOSPITAL | Age: 67
End: 2022-04-22
Payer: COMMERCIAL

## 2022-04-22 DIAGNOSIS — Z79.899 ENCOUNTER FOR LONG-TERM (CURRENT) USE OF OTHER MEDICATIONS: ICD-10-CM

## 2022-04-22 DIAGNOSIS — N40.0 BENIGN PROSTATIC HYPERPLASIA, UNSPECIFIED WHETHER LOWER URINARY TRACT SYMPTOMS PRESENT: Primary | ICD-10-CM

## 2022-04-22 LAB
CHOLEST SERPL-MCNC: 190 MG/DL
HDLC SERPL-MCNC: 37 MG/DL
LDLC SERPL CALC-MCNC: 136 MG/DL (ref 0–100)
NONHDLC SERPL-MCNC: 153 MG/DL
TRIGL SERPL-MCNC: 84 MG/DL

## 2022-04-22 PROCEDURE — 80175 DRUG SCREEN QUAN LAMOTRIGINE: CPT

## 2022-04-22 PROCEDURE — 80061 LIPID PANEL: CPT

## 2022-04-24 ENCOUNTER — TELEPHONE (OUTPATIENT)
Dept: INTERNAL MEDICINE CLINIC | Facility: CLINIC | Age: 67
End: 2022-04-24

## 2022-04-24 DIAGNOSIS — R97.20 ELEVATED PSA: Primary | ICD-10-CM

## 2022-04-24 NOTE — TELEPHONE ENCOUNTER
Patient calling stating he is calling back to speak with Dr Chamorro Early regarding his test results left on voicemail  Paged  advising that the patient did receive vm and to call if needed

## 2022-04-24 NOTE — TELEPHONE ENCOUNTER
Voicemail message left informing patient of positive PSA elevation  Will repeat PSA in May and avoid any sex or activity that will stimulate the prostate 48 hours beforehand

## 2022-04-25 ENCOUNTER — APPOINTMENT (OUTPATIENT)
Dept: LAB | Facility: HOSPITAL | Age: 67
End: 2022-04-25
Payer: COMMERCIAL

## 2022-04-25 DIAGNOSIS — R97.20 ELEVATED PSA: ICD-10-CM

## 2022-04-25 LAB — LAMOTRIGINE SERPL-MCNC: 5.8 UG/ML (ref 2–20)

## 2022-04-25 PROCEDURE — 84154 ASSAY OF PSA FREE: CPT

## 2022-04-25 PROCEDURE — 84153 ASSAY OF PSA TOTAL: CPT

## 2022-04-25 PROCEDURE — 36415 COLL VENOUS BLD VENIPUNCTURE: CPT

## 2022-04-27 LAB
PSA FREE MFR SERPL: 21.8 %
PSA FREE SERPL-MCNC: 1.09 NG/ML
PSA SERPL-MCNC: 5 NG/ML (ref 0–4)

## 2022-04-28 ENCOUNTER — TELEPHONE (OUTPATIENT)
Dept: INTERNAL MEDICINE CLINIC | Facility: CLINIC | Age: 67
End: 2022-04-28

## 2022-04-28 NOTE — TELEPHONE ENCOUNTER
Ninfa Arce called and is asking if you can prescribe 800 mg of ibuprofen every 8 hours per your conversation instead of taking multiple tablets  He mentioned it should be for 2 weeks possibly with a refill  [facility] [address]    He also said he started getting heartburn with the 800 mg  Can he take tums or advise to help with the heartburn?

## 2022-04-28 NOTE — TELEPHONE ENCOUNTER
----- Message from Frances Mantilla DO sent at 4/28/2022  7:31 AM EDT -----  Please let patient know his PSA is now up to 5, he should see urology for an evaluation

## 2022-05-09 ENCOUNTER — TELEPHONE (OUTPATIENT)
Dept: OTHER | Facility: OTHER | Age: 67
End: 2022-05-09

## 2022-05-09 NOTE — TELEPHONE ENCOUNTER
patient is requesting a TSH level and Free t4 lab script, his endocrinologist would like for him to repeat in 3 months  Please advise when that has been ordered   Also, if possible, please email the scripts to the email on file

## 2022-05-24 ENCOUNTER — OFFICE VISIT (OUTPATIENT)
Dept: INTERNAL MEDICINE CLINIC | Facility: CLINIC | Age: 67
End: 2022-05-24
Payer: COMMERCIAL

## 2022-05-24 VITALS
OXYGEN SATURATION: 99 % | HEART RATE: 65 BPM | RESPIRATION RATE: 14 BRPM | SYSTOLIC BLOOD PRESSURE: 124 MMHG | WEIGHT: 168.6 LBS | DIASTOLIC BLOOD PRESSURE: 84 MMHG | HEIGHT: 72 IN | BODY MASS INDEX: 22.84 KG/M2

## 2022-05-24 DIAGNOSIS — E03.9 HYPOTHYROIDISM, UNSPECIFIED TYPE: ICD-10-CM

## 2022-05-24 DIAGNOSIS — Z00.00 MEDICARE ANNUAL WELLNESS VISIT, INITIAL: ICD-10-CM

## 2022-05-24 DIAGNOSIS — E78.5 HYPERLIPIDEMIA, UNSPECIFIED HYPERLIPIDEMIA TYPE: ICD-10-CM

## 2022-05-24 DIAGNOSIS — F33.9 DEPRESSION, RECURRENT (HCC): ICD-10-CM

## 2022-05-24 DIAGNOSIS — Z12.11 SCREENING FOR COLON CANCER: ICD-10-CM

## 2022-05-24 DIAGNOSIS — Z23 ENCOUNTER FOR IMMUNIZATION: ICD-10-CM

## 2022-05-24 DIAGNOSIS — R97.20 ELEVATED PSA: Primary | ICD-10-CM

## 2022-05-24 DIAGNOSIS — N41.0 ACUTE PROSTATITIS: ICD-10-CM

## 2022-05-24 PROCEDURE — 3288F FALL RISK ASSESSMENT DOCD: CPT | Performed by: INTERNAL MEDICINE

## 2022-05-24 PROCEDURE — 1170F FXNL STATUS ASSESSED: CPT | Performed by: INTERNAL MEDICINE

## 2022-05-24 PROCEDURE — 3725F SCREEN DEPRESSION PERFORMED: CPT | Performed by: INTERNAL MEDICINE

## 2022-05-24 PROCEDURE — 1036F TOBACCO NON-USER: CPT | Performed by: INTERNAL MEDICINE

## 2022-05-24 PROCEDURE — G0438 PPPS, INITIAL VISIT: HCPCS | Performed by: INTERNAL MEDICINE

## 2022-05-24 PROCEDURE — 3008F BODY MASS INDEX DOCD: CPT | Performed by: INTERNAL MEDICINE

## 2022-05-24 PROCEDURE — 1160F RVW MEDS BY RX/DR IN RCRD: CPT | Performed by: INTERNAL MEDICINE

## 2022-05-24 PROCEDURE — 1125F AMNT PAIN NOTED PAIN PRSNT: CPT | Performed by: INTERNAL MEDICINE

## 2022-05-24 PROCEDURE — 99214 OFFICE O/P EST MOD 30 MIN: CPT | Performed by: INTERNAL MEDICINE

## 2022-05-24 RX ORDER — MIRTAZAPINE 15 MG/1
15 TABLET, FILM COATED ORAL EVERY EVENING
COMMUNITY
Start: 2022-02-16 | End: 2022-05-24

## 2022-05-24 RX ORDER — LAMOTRIGINE 200 MG/1
150 TABLET ORAL DAILY
COMMUNITY
Start: 2022-04-29

## 2022-05-24 RX ORDER — ZOSTER VACCINE RECOMBINANT, ADJUVANTED 50 MCG/0.5
0.5 KIT INTRAMUSCULAR ONCE
Qty: 1 EACH | Refills: 1 | Status: SHIPPED | OUTPATIENT
Start: 2022-05-24 | End: 2022-05-24

## 2022-05-24 RX ORDER — DEXTROAMPHETAMINE SACCHARATE, AMPHETAMINE ASPARTATE, DEXTROAMPHETAMINE SULFATE AND AMPHETAMINE SULFATE 2.5; 2.5; 2.5; 2.5 MG/1; MG/1; MG/1; MG/1
1 TABLET ORAL 2 TIMES DAILY
COMMUNITY
Start: 2022-05-10

## 2022-05-24 RX ORDER — MIRTAZAPINE 30 MG/1
TABLET, FILM COATED ORAL
COMMUNITY
Start: 2022-05-24

## 2022-05-24 RX ORDER — QUETIAPINE FUMARATE 25 MG/1
TABLET, FILM COATED ORAL
COMMUNITY
Start: 2022-02-21 | End: 2022-05-24

## 2022-05-24 NOTE — PATIENT INSTRUCTIONS
Medicare Preventive Visit Patient Instructions  Thank you for completing your Welcome to Medicare Visit or Medicare Annual Wellness Visit today  Your next wellness visit will be due in one year (5/25/2023)  The screening/preventive services that you may require over the next 5-10 years are detailed below  Some tests may not apply to you based off risk factors and/or age  Screening tests ordered at today's visit but not completed yet may show as past due  Also, please note that scanned in results may not display below  Preventive Screenings:  Service Recommendations Previous Testing/Comments   Colorectal Cancer Screening  · Colonoscopy    · Fecal Occult Blood Test (FOBT)/Fecal Immunochemical Test (FIT)  · Fecal DNA/Cologuard Test  · Flexible Sigmoidoscopy Age: 54-65 years old   Colonoscopy: every 10 years (May be performed more frequently if at higher risk)  OR  FOBT/FIT: every 1 year  OR  Cologuard: every 3 years  OR  Sigmoidoscopy: every 5 years  Screening may be recommended earlier than age 48 if at higher risk for colorectal cancer  Also, an individualized decision between you and your healthcare provider will decide whether screening between the ages of 74-80 would be appropriate  Colonoscopy: Not on file  FOBT/FIT: Not on file  Cologuard: Not on file  Sigmoidoscopy: Not on file          Prostate Cancer Screening Individualized decision between patient and health care provider in men between ages of 53-78   Medicare will cover every 12 months beginning on the day after your 50th birthday PSA: 5 0 ng/mL     Screening Current     Hepatitis C Screening Once for adults born between [facility]  More frequently in patients at high risk for Hepatitis C Hep C Antibody: Not on file        Diabetes Screening 1-2 times per year if you're at risk for diabetes or have pre-diabetes Fasting glucose: 111 mg/dL   A1C: 5 6 %    Screening Current   Cholesterol Screening Once every 5 years if you don't have a lipid disorder  May order more often based on risk factors  Lipid panel: 04/22/2022    Screening Not Indicated  History Lipid Disorder      Other Preventive Screenings Covered by Medicare:  1  Abdominal Aortic Aneurysm (AAA) Screening: covered once if your at risk  You're considered to be at risk if you have a family history of AAA or a male between the age of 73-68 who smoking at least 100 cigarettes in your lifetime  2  Lung Cancer Screening: covers low dose CT scan once per year if you meet all of the following conditions: (1) Age 50-69; (2) No signs or symptoms of lung cancer; (3) Current smoker or have quit smoking within the last 15 years; (4) You have a tobacco smoking history of at least 30 pack years (packs per day x number of years you smoked); (5) You get a written order from a healthcare provider  3  Glaucoma Screening: covered annually if you're considered high risk: (1) You have diabetes OR (2) Family history of glaucoma OR (3)  aged 48 and older OR (3)  American aged 72 and older  3  Osteoporosis Screening: covered every 2 years if you meet one of the following conditions: (1) Have a vertebral abnormality; (2) On glucocorticoid therapy for more than 3 months; (3) Have primary hyperparathyroidism; (4) On osteoporosis medications and need to assess response to drug therapy  5  HIV Screening: covered annually if you're between the age of 12-76  Also covered annually if you are younger than 13 and older than 72 with risk factors for HIV infection  For pregnant patients, it is covered up to 3 times per pregnancy      Immunizations:  Immunization Recommendations   Influenza Vaccine Annual influenza vaccination during flu season is recommended for all persons aged >= 6 months who do not have contraindications   Pneumococcal Vaccine (Prevnar and Pneumovax)  * Prevnar = PCV13  * Pneumovax = PPSV23 Adults 25-60 years old: 1-3 doses may be recommended based on certain risk factors  Adults 65+ years old: Prevnar (PCV13) vaccine recommended followed by Pneumovax (PPSV23) vaccine  If already received PPSV23 since turning 65, then PCV13 recommended at least one year after PPSV23 dose  Hepatitis B Vaccine 3 dose series if at intermediate or high risk (ex: diabetes, end stage renal disease, liver disease)   Tetanus (Td) Vaccine - COST NOT COVERED BY MEDICARE PART B Following completion of primary series, a booster dose should be given every 10 years to maintain immunity against tetanus  Td may also be given as tetanus wound prophylaxis  Tdap Vaccine - COST NOT COVERED BY MEDICARE PART B Recommended at least once for all adults  For pregnant patients, recommended with each pregnancy  Shingles Vaccine (Shingrix) - COST NOT COVERED BY MEDICARE PART B  2 shot series recommended in those aged 48 and above     Health Maintenance Due:      Topic Date Due    Hepatitis C Screening  Never done    Colorectal Cancer Screening  Never done     Immunizations Due:      Topic Date Due    DTaP,Tdap,and Td Vaccines (1 - Tdap) Never done    Pneumococcal Vaccine: 65+ Years (1 - PCV) Never done    COVID-19 Vaccine (4 - Booster for Cindy Dasen series) 03/03/2022     Advance Directives   What are advance directives? Advance directives are legal documents that state your wishes and plans for medical care  These plans are made ahead of time in case you lose your ability to make decisions for yourself  Advance directives can apply to any medical decision, such as the treatments you want, and if you want to donate organs  What are the types of advance directives? There are many types of advance directives, and each state has rules about how to use them  You may choose a combination of any of the following:  · Living will: This is a written record of the treatment you want  You can also choose which treatments you do not want, which to limit, and which to stop at a certain time   This includes surgery, medicine, IV fluid, and tube feedings  · Durable power of  for healthcare [facility]): This is a written record that states who you want to make healthcare choices for you when you are unable to make them for yourself  This person, called a proxy, is usually a family member or a friend  You may choose more than 1 proxy  · Do not resuscitate (DNR) order:  A DNR order is used in case your heart stops beating or you stop breathing  It is a request not to have certain forms of treatment, such as CPR  A DNR order may be included in other types of advance directives  · Medical directive: This covers the care that you want if you are in a coma, near death, or unable to make decisions for yourself  You can list the treatments you want for each condition  Treatment may include pain medicine, surgery, blood transfusions, dialysis, IV or tube feedings, and a ventilator (breathing machine)  · Values history: This document has questions about your views, beliefs, and how you feel and think about life  This information can help others choose the care that you would choose  Why are advance directives important? An advance directive helps you control your care  Although spoken wishes may be used, it is better to have your wishes written down  Spoken wishes can be misunderstood, or not followed  Treatments may be given even if you do not want them  An advance directive may make it easier for your family to make difficult choices about your care  © Copyright [employer] 2018 Information is for End User's use only and may not be sold, redistributed or otherwise used for commercial purposes   All illustrations and images included in CareNotes® are the copyrighted property of A D A M , Inc  or [address] [place], [place] [address]

## 2022-05-24 NOTE — PROGRESS NOTES
Assessment/Plan:    #Elevated PSA  -saw urology  -PSA up to 5  -possibly prostatitis and completed 2 week course of ibuprofen 800mg TID for 2 weeks  -urology to obtain 4k and may require MRI prostate if abnormal    #Depression  -seeing psychiatry Dr Luigi Mendoza  -previously on abilify, klonopin, ritalin, cymbalta  -previous history of transcranial magnetic stimulation and ECT therapy, reports he felt foggy minded and forgetful after ECT  -now on adderrall, klonopin, lamictal, remeron  -has persistent depression     #Vitamin D Deficiency  -no longer taking vitamin D     #BPH  -has urinary urgency but denies nocturia or frequency     #Family History  -mother with HLD  -patient on aspirin  -2021 carotid US with <50% stenosis b/l    #Hypothyroid  -TSH 5 131  -continue to trend    #HLD  - HDL 37  -encouraged diet and exercise  -reports playing tennis now    #Right Knee Pain  -previous 2021 MRI knee with meniscus tear  -currently stable  -seeing orthopedics [facility]    #Erectile Dysfunction  -uses viagra prn     #Basal Cell  -seeing dermatology for annual skin check    #Health Maintenance  -routine labs nad followup 6 months  -colonoscopy with Dr Himanshu Go pending  -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 in \[employer] [place] [employer]\""  -covid vaccine up to date  -TDAP and shingrix pending       No problem-specific Assessment & Plan notes found for this encounter  Diagnoses and all orders for this visit:    Elevated PSA  -     CBC and differential; Future  -     Comprehensive metabolic panel; Future  -     PSA, total and free; Future    Acute prostatitis  -     CBC and differential; Future  -     Comprehensive metabolic panel; Future  -     PSA, total and free; Future    Screening for colon cancer  -     Ambulatory referral for colonoscopy; Future  -     CBC and differential; Future  -     Comprehensive metabolic panel;  Future    Hyperlipidemia, unspecified hyperlipidemia type  - CBC and differential; Future  -     Comprehensive metabolic panel; Future  -     Lipid Panel With Direct LDL; Future    Hypothyroidism, unspecified type  -     CBC and differential; Future  -     TSH, 3rd generation with Free T4 reflex; Future  -     Comprehensive metabolic panel; Future  -     TSH, 3rd generation; Future  -     T4, free; Future    Encounter for immunization  -     tetanus-diphtheria-acellular pertussis (ADACEL) 5-2-15 5 LF-mcg/0 5 injection; Inject 0 5 mL into a muscle once for 1 dose  -     Zoster Vac Recomb Adjuvanted (Shingrix) 50 MCG/0 5ML SUSR; Inject 0 5 mL into a muscle once for 1 dose Repeat dose in 2 to 6 months    Depression, recurrent ([facility] [address] )    Medicare annual wellness visit, initial    Other orders  -     amphetamine-dextroamphetamine (ADDERALL) 10 mg tablet; Take 1 tablet by mouth in the morning and 1 tablet in the evening   -     Discontinue: hydrocortisone 2 5 % cream; APPLY 1 APPLICATION ON THE SKIN THREE TIMES A DAY AS NEEDED (Patient not taking: Reported on 5/24/2022)  -     lamoTRIgine (LaMICtal) 150 MG tablet; Take 150 mg by mouth in the morning   -     Discontinue: mirtazapine (REMERON) 15 mg tablet;  Take 15 mg by mouth every evening (Patient not taking: Reported on 5/24/2022)  -     mirtazapine (REMERON) 30 mg tablet  -     Discontinue: QUEtiapine (SEROquel) 25 mg tablet; TAKE 1 TABLET BY MOUTH THREE TIMES A DAY AS NEEDED ANXIETY (Patient not taking: Reported on 5/24/2022)            Current Outpatient Medications:     amphetamine-dextroamphetamine (ADDERALL) 10 mg tablet, Take 1 tablet by mouth in the morning and 1 tablet in the evening , Disp: , Rfl:     lamoTRIgine (LaMICtal) 150 MG tablet, Take 150 mg by mouth in the morning , Disp: , Rfl:     mirtazapine (REMERON) 30 mg tablet, , Disp: , Rfl:     tetanus-diphtheria-acellular pertussis (ADACEL) 5-2-15 5 LF-mcg/0 5 injection, Inject 0 5 mL into a muscle once for 1 dose, Disp: 0 5 mL, Rfl: 0    Zoster Vac Recomb Adjuvanted (Shingrix) 50 MCG/0 5ML SUSR, Inject 0 5 mL into a muscle once for 1 dose Repeat dose in 2 to 6 months, Disp: 1 each, Rfl: 1    clonazePAM (KlonoPIN) 0 5 mg tablet, , Disp: , Rfl: 2    VIAGRA 50 MG tablet, , Disp: , Rfl: 3    Subjective:      Patient ID: Angela Nurse is a 77 y o  male  HPI     Patient presents for routine checkup  He had a previous episode of elevated PSA  His PSA was 4 4 on routine screening  It then jumped up to 5 on repeat  He followed up with Urology and underwent a BRETT examination which revealed possible prostatitis  He had been taking 800 mg t i d  of ibuprofen for 2 weeks  He will undergo 4 K prostate testing at this time  If his PSA remains elevated then he will undergo MRI imaging and proceed for the workup for prostate issues  His TSH is elevated at 5 131 and we will repeated at this time  His LDL is 136 and HDL 37  He defers starting on a statin  He will check his colleagues in Cardiology regarding a CT coronary calcium score  He previously had an episode of right knee pain however this has resolved  MRI the knee revealed meniscus tear  He is currently playing tennis and ambulating without any problems  He is due for colonoscopy and we encouraged him to obtain this  He is also due for the shingles and Tdap and pneumonia vaccines  We gave him scripts for all of these  He will return to care in 6 months  The following portions of the patient's history were reviewed and updated as appropriate: allergies, current medications, past family history, past medical history, past social history, past surgical history and problem list     Review of Systems   Constitutional: Negative for activity change, appetite change, fatigue and fever  HENT: Negative for congestion, ear pain, hearing loss, sore throat and tinnitus  Eyes: Negative for photophobia, pain and visual disturbance  Respiratory: Negative for cough, shortness of breath and wheezing  Cardiovascular: Negative for chest pain and leg swelling  Gastrointestinal: Negative for abdominal distention, abdominal pain, constipation, diarrhea, nausea and vomiting  Genitourinary: Negative for difficulty urinating, frequency and hematuria  Musculoskeletal: Negative for arthralgias, back pain, gait problem, joint swelling, neck pain and neck stiffness  Skin: Negative for color change, pallor, rash and wound  Neurological: Negative for dizziness, tremors, numbness and headaches  Hematological: Does not bruise/bleed easily  Psychiatric/Behavioral: Positive for decreased concentration and dysphoric mood  Negative for self-injury, sleep disturbance and suicidal ideas  The patient is not nervous/anxious  Objective:      /84   Pulse 65   Resp 14   Ht 6' (1 829 m)   Wt 76 5 kg (168 lb 9 6 oz)   SpO2 99%   BMI 22 87 kg/m²          Physical Exam  Vitals reviewed  Constitutional:       Appearance: He is well-developed  HENT:      Head: Normocephalic and atraumatic  Right Ear: External ear normal       Left Ear: External ear normal       Nose: Nose normal    Eyes:      Conjunctiva/sclera: Conjunctivae normal       Pupils: Pupils are equal, round, and reactive to light  Neck:      Thyroid: No thyromegaly  Vascular: No JVD  Cardiovascular:      Rate and Rhythm: Normal rate and regular rhythm  Pulses: Normal pulses  Heart sounds: Normal heart sounds  No murmur heard  Pulmonary:      Effort: Pulmonary effort is normal  No respiratory distress  Breath sounds: Normal breath sounds  No stridor  No wheezing, rhonchi or rales  Abdominal:      General: Bowel sounds are normal  There is no distension  Palpations: Abdomen is soft  There is no mass  Tenderness: There is no abdominal tenderness  There is no guarding or rebound  Musculoskeletal:         General: No tenderness or deformity  Normal range of motion        Cervical back: Normal range of motion and neck supple  Right lower leg: No edema  Left lower leg: No edema  Lymphadenopathy:      Cervical: No cervical adenopathy  Skin:     General: Skin is warm and dry  Findings: No erythema or rash  Neurological:      Mental Status: He is alert and oriented to person, place, and time  Deep Tendon Reflexes: Reflexes are normal and symmetric  This note was completed in part utilizing [employer] fluency direct voice recognition software  Grammatical errors, random word insertion, spelling mistakes, and incomplete sentences may be an occasional consequence of the system secondary to software limitations, ambient noise and hardware issues  At the time of dictation, efforts were made to edit, clarify and /or correct errors  Please read the chart carefully and recognize, using context, where substitutions have occurred  If you have any questions or concerns about the context, text or information contained within the body of this dictation, please contact myself, the provider, for further clarification

## 2022-05-24 NOTE — PROGRESS NOTES
Assessment and Plan:     Problem List Items Addressed This Visit        Other    Hyperlipidemia (Chronic)    Relevant Orders    CBC and differential    Comprehensive metabolic panel    Lipid Panel With Direct LDL    Depression, recurrent (HCC)    Relevant Medications    amphetamine-dextroamphetamine (ADDERALL) 10 mg tablet    mirtazapine (REMERON) 30 mg tablet      Other Visit Diagnoses     Elevated PSA    -  Primary    Relevant Orders    CBC and differential    Comprehensive metabolic panel    PSA, total and free    Acute prostatitis        Relevant Orders    CBC and differential    Comprehensive metabolic panel    PSA, total and free    Screening for colon cancer        Relevant Orders    Ambulatory referral for colonoscopy    CBC and differential    Comprehensive metabolic panel    Hypothyroidism, unspecified type        Relevant Orders    CBC and differential    TSH, 3rd generation with Free T4 reflex    Comprehensive metabolic panel    TSH, 3rd generation    T4, free    Encounter for immunization        Relevant Medications    tetanus-diphtheria-acellular pertussis (ADACEL) 5-2-15 5 LF-mcg/0 5 injection    Zoster Vac Recomb Adjuvanted (Shingrix) 50 MCG/0 5ML SUSR    Medicare annual wellness visit, initial               Preventive health issues were discussed with patient, and age appropriate screening tests were ordered as noted in patient's After Visit Summary  Personalized health advice and appropriate referrals for health education or preventive services given if needed, as noted in patient's After Visit Summary       History of Present Illness:     Patient presents for Medicare Annual Wellness visit    Patient Care Team:  Elena Cox DO as PCP - General (Internal Medicine)  Laxmi Mora MD as PCP - PCP-[facility] Attributed-Roster     Problem List:     Patient Active Problem List   Diagnosis    Depression, recurrent ([facility] [address] )    Hyperlipidemia    Hyperglycemia      Past Medical and Surgical History:     Past Medical History:   Diagnosis Date    BPH (benign prostatic hyperplasia)     resolved 11/11/2015     History reviewed  No pertinent surgical history  Family History:     Family History   Problem Relation Age of Onset    Lung cancer Father       Social History:     Social History     Socioeconomic History    Marital status: /Civil Union     Spouse name: None    Number of children: None    Years of education: None    Highest education level: None   Occupational History    None   Tobacco Use    Smoking status: Former Smoker    Smokeless tobacco: Former User   Substance and Sexual Activity    Alcohol use: No    Drug use: Never    Sexual activity: Not Currently   Other Topics Concern    None   Social History Narrative    None     Social Determinants of Health     Financial Resource Strain: Not on file   Food Insecurity: Not on file   Transportation Needs: Not on file   Physical Activity: Not on file   Stress: Not on file   Social Connections: Not on file   Intimate Partner Violence: Not on file   Housing Stability: Not on file      Medications and Allergies:     Current Outpatient Medications   Medication Sig Dispense Refill    amphetamine-dextroamphetamine (ADDERALL) 10 mg tablet Take 1 tablet by mouth in the morning and 1 tablet in the evening   lamoTRIgine (LaMICtal) 150 MG tablet Take 150 mg by mouth in the morning   mirtazapine (REMERON) 30 mg tablet       tetanus-diphtheria-acellular pertussis (ADACEL) 5-2-15 5 LF-mcg/0 5 injection Inject 0 5 mL into a muscle once for 1 dose 0 5 mL 0    Zoster Vac Recomb Adjuvanted (Shingrix) 50 MCG/0 5ML SUSR Inject 0 5 mL into a muscle once for 1 dose Repeat dose in 2 to 6 months 1 each 1    clonazePAM (KlonoPIN) 0 5 mg tablet  (Patient not taking: Reported on 5/24/2022)  2    VIAGRA 50 MG tablet   3     No current facility-administered medications for this visit       Allergies   Allergen Reactions    Dye [Iodinated Diagnostic Agents] Other (See Comments)      Immunizations:     Immunization History   Administered Date(s) Administered    COVID-19 MODERNA VACC 0 5 ML IM 12/28/2020, 01/24/2021, 11/03/2021    Influenza, injectable, quadrivalent, preservative free 0 5 mL 10/25/2018      Health Maintenance:         Topic Date Due    Hepatitis C Screening  Never done    Colorectal Cancer Screening  Never done         Topic Date Due    DTaP,Tdap,and Td Vaccines (1 - Tdap) Never done    Pneumococcal Vaccine: 65+ Years (1 - PCV) Never done    COVID-19 Vaccine (4 - Booster for Moderna series) 03/03/2022      Medicare Health Risk Assessment:     /84   Pulse 65   Resp 14   Ht 6' (1 829 m)   Wt 76 5 kg (168 lb 9 6 oz)   SpO2 99%   BMI 22 87 kg/m²      Charlie Montenegro is here for his Subsequent Wellness visit  Last Medicare Wellness visit information reviewed, patient interviewed and updates made to the record today  Health Risk Assessment:   Patient rates overall health as very good  Patient feels that their physical health rating is same  Patient is satisfied with their life  Eyesight was rated as same  Hearing was rated as same  Patient feels that their emotional and mental health rating is same  Patients states they are never, rarely angry  Patient states they are never, rarely unusually tired/fatigued  Pain experienced in the last 7 days has been none  Patient states that he has experienced no weight loss or gain in last 6 months  Fall Risk Screening: In the past year, patient has experienced: no history of falling in past year      Home Safety:  Patient does not have trouble with stairs inside or outside of their home  Patient has working smoke alarms and has working carbon monoxide detector  Home safety hazards include: none  Nutrition:   Current diet is Regular  Medications:   Patient is currently taking over-the-counter supplements   OTC medications include: see medication list  Patient is able to manage medications  Activities of Daily Living (ADLs)/Instrumental Activities of Daily Living (IADLs):   Walk and transfer into and out of bed and chair?: Yes  Dress and groom yourself?: Yes    Bathe or shower yourself?: Yes    Feed yourself? Yes  Do your laundry/housekeeping?: Yes  Manage your money, pay your bills and track your expenses?: Yes  Make your own meals?: Yes    Do your own shopping?: Yes    Previous Hospitalizations:   Any hospitalizations or ED visits within the last 12 months?: No      Advance Care Planning:   Living will: Yes    Durable POA for healthcare: Yes    Advanced directive: Yes    Five wishes given: Yes      PREVENTIVE SCREENINGS      Cardiovascular Screening:    General: Screening Not Indicated and History Lipid Disorder      Diabetes Screening:     General: Screening Current      Prostate Cancer Screening:    General: Screening Current      Abdominal Aortic Aneurysm (AAA) Screening:    Risk factors include: age between 73-69 yo and tobacco use        Lung Cancer Screening:     General: Screening Not Indicated    Screening, Brief Intervention, and Referral to Treatment (SBIRT)    Screening  Typical number of drinks in a day: 0  Typical number of drinks in a week: 0  Interpretation: Low risk drinking behavior      Single Item Drug Screening:  How often have you used an illegal drug (including marijuana) or a prescription medication for non-medical reasons in the past year? never    Single Item Drug Screen Score: 0  Interpretation: Negative screen for possible drug use disorder      Johny Flowers DO

## 2022-06-02 ENCOUNTER — TELEPHONE (OUTPATIENT)
Dept: INTERNAL MEDICINE CLINIC | Facility: CLINIC | Age: 67
End: 2022-06-02

## 2022-06-02 ENCOUNTER — TELEPHONE (OUTPATIENT)
Dept: OTHER | Facility: OTHER | Age: 67
End: 2022-06-02

## 2022-06-02 ENCOUNTER — TELEPHONE (OUTPATIENT)
Dept: UROLOGY | Facility: MEDICAL CENTER | Age: 67
End: 2022-06-02

## 2022-06-02 NOTE — TELEPHONE ENCOUNTER
PT CALLED, SAID THAT HE NEEDS TO SPEAK WITH YOU ABOUT WHICH UROLOGIST YOU WOULD RECOMMEND HE SEE    PT WAS VERY RUDE ON THE PHONE, REFUSED DR WAITE'S OPINION, AND KEPT DEMANDING THAT I HAVE YOU CALL HIM ASAP WITH THIS INFORMATION

## 2022-06-02 NOTE — TELEPHONE ENCOUNTER
I returned the patient's call  We discussed elevated PSA in the risk prostate cancer  He will likely proceed with a multiparametric MRI  I am not sure if he will be seeing us or standing with [facility]

## 2022-06-02 NOTE — TELEPHONE ENCOUNTER
Please Triage -   New Patient- [facility]    What is the reason for the patients appointment? 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 Provider & Plan: Costa nguyen   Member ID#: Has the patient had any previous urologist(s)? [facility]        Have patient records been requested? Has the patient had any outside testing done?       Does the patient have a personal history of cancer?no       Patient can be reached at :   [phone] (

## 2022-06-02 NOTE — TELEPHONE ENCOUNTER
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

## 2022-06-02 NOTE — TELEPHONE ENCOUNTER
PT CALLED BACK, THIS TIME ASKED THAT I ASK YOU WHAT UROLOGIST YOU WOULD RECOMMEND     DR LUNA TOLD ME TO TELL HIM  [facility] OR DR HEART    DO YOU AGREE?

## 2022-06-03 NOTE — TELEPHONE ENCOUNTER
Paula HUGHES spoke with patient and plan for appt with Dr Eva Morel  Please offer patient 6/20/22 at 2pm with Dr Eva Morel, this was tentatively scheduled

## 2022-06-07 DIAGNOSIS — R97.20 ELEVATED PSA: Primary | ICD-10-CM

## 2022-06-07 NOTE — TELEPHONE ENCOUNTER
Patient is calling to confirm appointment  Patient also wanting to know if he should get a MRI of his prostate before this appointment on 6/20    Please advise      Patient can be reached at [phone]

## 2022-06-07 NOTE — TELEPHONE ENCOUNTER
Corin Regalado PA-C  [facility] Clinical  MRI and BMP ordered as requested  Abdomen soft, non-tender, no guarding.

## 2022-06-09 ENCOUNTER — APPOINTMENT (OUTPATIENT)
Dept: LAB | Facility: HOSPITAL | Age: 67
End: 2022-06-09
Payer: COMMERCIAL

## 2022-06-09 DIAGNOSIS — R97.20 ELEVATED PSA: ICD-10-CM

## 2022-06-09 LAB
ANION GAP SERPL CALCULATED.3IONS-SCNC: 7 MMOL/L (ref 4–13)
BUN SERPL-MCNC: 21 MG/DL (ref 5–25)
CALCIUM SERPL-MCNC: 9 MG/DL (ref 8.3–10.1)
CHLORIDE SERPL-SCNC: 105 MMOL/L (ref 100–108)
CO2 SERPL-SCNC: 28 MMOL/L (ref 21–32)
CREAT SERPL-MCNC: 1.24 MG/DL (ref 0.6–1.3)
GFR SERPL CREATININE-BSD FRML MDRD: 60 ML/MIN/1.73SQ M
GLUCOSE SERPL-MCNC: 121 MG/DL (ref 65–140)
POTASSIUM SERPL-SCNC: 4 MMOL/L (ref 3.5–5.3)
SODIUM SERPL-SCNC: 140 MMOL/L (ref 136–145)

## 2022-06-09 PROCEDURE — 80048 BASIC METABOLIC PNL TOTAL CA: CPT

## 2022-06-09 PROCEDURE — 36415 COLL VENOUS BLD VENIPUNCTURE: CPT

## 2022-06-09 NOTE — TELEPHONE ENCOUNTER
Pt would like a [facility] message regarding his labs  Advised everything was WNL and he should be ok to continue with the MRI, still requesting a message from [employer] be sent regarding his results

## 2022-06-09 NOTE — TELEPHONE ENCOUNTER
I understood that he called and requested the MRI to be done before his appointment with Dr Jimmy Escamilla which I ordered  So the answer is yes he should get an MRI before he sees Dr Jimmy Escamilla!

## 2022-06-10 ENCOUNTER — NURSE TRIAGE (OUTPATIENT)
Dept: OTHER | Facility: OTHER | Age: 67
End: 2022-06-10

## 2022-06-10 NOTE — TELEPHONE ENCOUNTER
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

## 2022-06-10 NOTE — TELEPHONE ENCOUNTER
Pt called in to find out if he needs to fast prior to his MRI of the prostate scheduled for Monday 6/13  Did not see anything in pts chart specifying if he should or should not fast      Paged on call provider to address  Per Nate iglesias- pt can have clear liquid diet after 9 pm on Sunday and can have regular diet before 9 pm no Sunday  Recommendation given to pt who verbalized understanding

## 2022-06-13 ENCOUNTER — HOSPITAL ENCOUNTER (OUTPATIENT)
Dept: RADIOLOGY | Age: 67
Discharge: HOME | End: 2022-06-13
Payer: COMMERCIAL

## 2022-06-13 DIAGNOSIS — R97.20 ELEVATED PSA: ICD-10-CM

## 2022-06-13 PROCEDURE — 72197 MRI PELVIS W/O & W/DYE: CPT

## 2022-06-13 PROCEDURE — A9585 GADOBUTROL INJECTION: HCPCS | Performed by: PHYSICIAN ASSISTANT

## 2022-06-13 PROCEDURE — 76377 3D RENDER W/INTRP POSTPROCES: CPT

## 2022-06-13 PROCEDURE — G1004 CDSM NDSC: HCPCS

## 2022-06-13 RX ADMIN — GADOBUTROL 7 ML: 604.72 INJECTION INTRAVENOUS at 13:17

## 2022-06-20 ENCOUNTER — OFFICE VISIT (OUTPATIENT)
Dept: UROLOGY | Facility: CLINIC | Age: 67
End: 2022-06-20
Payer: COMMERCIAL

## 2022-06-20 VITALS
SYSTOLIC BLOOD PRESSURE: 140 MMHG | WEIGHT: 163 LBS | BODY MASS INDEX: 22.08 KG/M2 | DIASTOLIC BLOOD PRESSURE: 82 MMHG | HEIGHT: 72 IN

## 2022-06-20 DIAGNOSIS — R97.20 ELEVATED PSA: Primary | ICD-10-CM

## 2022-06-20 PROCEDURE — 1160F RVW MEDS BY RX/DR IN RCRD: CPT | Performed by: UROLOGY

## 2022-06-20 PROCEDURE — 1036F TOBACCO NON-USER: CPT | Performed by: UROLOGY

## 2022-06-20 PROCEDURE — 3008F BODY MASS INDEX DOCD: CPT | Performed by: UROLOGY

## 2022-06-20 PROCEDURE — 99204 OFFICE O/P NEW MOD 45 MIN: CPT | Performed by: UROLOGY

## 2022-06-20 NOTE — PROGRESS NOTES
Referring Physician: Johny Flowers DO  A copy of this note was sent to the referring physician  Diagnoses and all orders for this visit:    Elevated PSA  -     PSA Total, Diagnostic; Future  -     PSA, Total Screen; Future            Assessment and plan:       1  Elevated PSA  Lab Results   Component Value Date    PSA 5 0 (H) 04/25/2022    PSA 4 4 (H) 04/22/2022    PSA 2 1 04/20/2021       2  Negative multiparametric MRI  - MP MRI June 2022:  No detectable lesions, PI-RADS 2, 47 g gland      We discussed the role of PSA as a screening biomarker for prostate cancer, including the benefits and current controversies  We discussed that prostate cancer is the most common non-skin  cancer in men in the [place], and the second leading cause of cancer death in men  One in six men will be diagnosed with prostate cancer during his lifetime  Over 30,000 men die each year from prostate cancer; however, early detection may save lives  A variety of factors can affect PSA levels and should be considered in the interpretation of results  The three most common prostate diseases- prostatitis, benign prostatic hyperplasia (BPH), and prostate cancer-may cause elevated PSA levels in the blood  Men choosing to undergo PSA testing should know that some important factors may influence results  - Change in PSA levels over time, known as PSA velocity, is used to assess both cancer risk and aggressiveness  - Blood PSA levels tend to increase with age  - Larger prostates produce larger amounts of PSA  A prostate biopsy confirms the presence of prostate cancer  The decision to proceed with a prostate biopsy should be based primarily on PSA and BRETT results  It should also take into account other factors including a man's family history of prostate cancer, his race, any prior biopsy history and other significant health issues he may have       In the setting of a negative MRI I recommended a conservative approach with repeating a PSA in the next 4-6 weeks  I will follow-up with the patient electronically based on those results  If the PSA is return to normal we will plan to continue with a regular screening in 6 months time  If the PSA is abnormal, I would likely repeat 1 more time and recommend proceeding with a biopsy if the numbers continue to rise      Syed Braxton MD      Chief Complaint     2nd opinion PSA      History of Present Illness     Malissa Nash is a 77 y o  male referred for 2nd opinion of elevated PSA  This is a very pleasant 60-year-old male  He has a very remote history of prostatitis manifested with gross hematuria in his late 35s  He has been undergoing regular prostate cancer screening with his primary care physician  Recently the PSA was found to rise to 4 4  Patient was evaluated by Dr Roshan Edward at [facility] at that time  He was prescribed an empiric course of NSAIDs  The PSA was repeated actually liliam further 5 0  He was then evaluated with an MRI  Patient has no familial history of prostate cancer nor genitourinary malignancies  Patient is employed as an ophthalmologist     Detailed Urologic History     - please refer to HPI    Review of Systems     Review of Systems   Constitutional: Negative for activity change and fatigue  HENT: Negative for congestion  Eyes: Negative for visual disturbance  Respiratory: Negative for shortness of breath and wheezing  Cardiovascular: Negative for chest pain and leg swelling  Gastrointestinal: Negative for abdominal pain  Endocrine: Negative for polyuria  Genitourinary: Negative for dysuria, flank pain, hematuria and urgency  Musculoskeletal: Negative for back pain  Allergic/Immunologic: Negative for immunocompromised state  Neurological: Negative for dizziness and numbness  Psychiatric/Behavioral: Negative for dysphoric mood  All other systems reviewed and are negative        AUA SYMPTOM SCORE    Flowsheet Row Most Recent Value   AUA SYMPTOM SCORE    How often have you had a sensation of not emptying your bladder completely after you finished urinating? 0 (P)     How often have you had to urinate again less than two hours after you finished urinating? 0 (P)     How often have you found you stopped and started again several times when you urinate? 0 (P)     How often have you found it difficult to postpone urination? 2 (P)     How often have you had a weak urinary stream? 0 (P)     How often have you had to push or strain to begin urination? 0 (P)     How many times did you most typically get up to urinate from the time you went to bed at night until the time you got up in the morning? 0 (P)     Quality of Life: If you were to spend the rest of your life with your urinary condition just the way it is now, how would you feel about that? 1 (P)     AUA SYMPTOM SCORE 2 (P)             Allergies     No Known Allergies    Physical Exam     Physical Exam  Constitutional:       General: He is not in acute distress  Appearance: He is well-developed  HENT:      Head: Normocephalic and atraumatic  Cardiovascular:      Comments: Negative lower extremity edema  Pulmonary:      Effort: Pulmonary effort is normal       Breath sounds: Normal breath sounds  Abdominal:      Palpations: Abdomen is soft  Genitourinary:     Penis: Normal        Prostate: Normal    Musculoskeletal:         General: Normal range of motion  Cervical back: Normal range of motion  Skin:     General: Skin is warm  Neurological:      Mental Status: He is alert and oriented to person, place, and time     Psychiatric:         Behavior: Behavior normal              Vital Signs  Vitals:    06/20/22 1346   BP: 140/82   BP Location: Left arm   Patient Position: Sitting   Cuff Size: Adult   Weight: 73 9 kg (163 lb)   Height: 6' (1 829 m)         Current Medications       Current Outpatient Medications:    amphetamine-dextroamphetamine (ADDERALL) 10 mg tablet, Take 1 tablet by mouth in the morning and 1 tablet in the evening , Disp: , Rfl:     clonazePAM (KlonoPIN) 0 5 mg tablet, , Disp: , Rfl: 2    lamoTRIgine (LaMICtal) 200 MG tablet, Take 150 mg by mouth in the morning , Disp: , Rfl:     mirtazapine (REMERON) 30 mg tablet, , Disp: , Rfl:     VIAGRA 50 MG tablet, , Disp: , Rfl: 3      Active Problems     Patient Active Problem List   Diagnosis    Depression, recurrent ([facility] [address] )    Hyperlipidemia    Hyperglycemia         Past Medical History     Past Medical History:   Diagnosis Date    BPH (benign prostatic hyperplasia)     resolved 11/11/2015         Surgical History     History reviewed  No pertinent surgical history  Family History     Family History   Problem Relation Age of Onset    Lung cancer Father          Social History     Social History     Social History     Tobacco Use   Smoking Status Former Smoker   Smokeless Tobacco Former User         Pertinent Lab Values     Lab Results   Component Value Date    CREATININE 1 24 06/09/2022       Lab Results   Component Value Date    PSA 5 0 (H) 04/25/2022    PSA 4 4 (H) 04/22/2022    PSA 2 1 04/20/2021       @RESULTRCNT(1H])@      Pertinent Imaging      - n/a    Portions of the record may have been created with voice recognition software   Occasional wrong word or "sound a like" substitutions may have occurred due to the inherent limitations of voice recognition software   Read the chart carefully and recognize, using context, where substitutions have occurred

## 2022-07-18 ENCOUNTER — TELEPHONE (OUTPATIENT)
Dept: OTHER | Facility: OTHER | Age: 67
End: 2022-07-18

## 2022-07-25 ENCOUNTER — APPOINTMENT (OUTPATIENT)
Dept: LAB | Facility: HOSPITAL | Age: 67
End: 2022-07-25
Payer: COMMERCIAL

## 2022-07-25 DIAGNOSIS — E03.9 HYPOTHYROIDISM, UNSPECIFIED TYPE: ICD-10-CM

## 2022-07-25 DIAGNOSIS — N41.0 ACUTE PROSTATITIS: ICD-10-CM

## 2022-07-25 DIAGNOSIS — R97.20 ELEVATED PSA: ICD-10-CM

## 2022-07-25 PROCEDURE — 84153 ASSAY OF PSA TOTAL: CPT

## 2022-07-25 PROCEDURE — 84154 ASSAY OF PSA FREE: CPT

## 2022-07-28 ENCOUNTER — TELEPHONE (OUTPATIENT)
Dept: UROLOGY | Facility: CLINIC | Age: 67
End: 2022-07-28

## 2022-07-28 LAB
PSA FREE MFR SERPL: 14.8 %
PSA FREE SERPL-MCNC: 0.62 NG/ML
PSA SERPL-MCNC: 4.2 NG/ML (ref 0–4)

## 2022-07-28 NOTE — TELEPHONE ENCOUNTER
Left patient a voicemail  PSA has decreased down to 4 2  In the setting of a recent negative multiparametric MRI I told the patient I am very confident with plan for continued monitoring  He is scheduled for a visit with Antonieta Scales in January with a repeat PSA prior to that time    Assuming things remain stable we can plan for annual PSA follow-up going forward

## 2022-07-29 DIAGNOSIS — E03.9 HYPOTHYROIDISM, UNSPECIFIED TYPE: Primary | ICD-10-CM

## 2022-08-03 ENCOUNTER — VBI (OUTPATIENT)
Dept: ADMINISTRATIVE | Facility: OTHER | Age: 67
End: 2022-08-03

## 2022-09-19 ENCOUNTER — TELEPHONE (OUTPATIENT)
Dept: PSYCHIATRY | Facility: CLINIC | Age: 67
End: 2022-09-19

## 2022-09-19 DIAGNOSIS — F33.9 DEPRESSION, RECURRENT (HCC): Primary | ICD-10-CM

## 2022-09-19 NOTE — TELEPHONE ENCOUNTER
Connected with patient via telephone and verified that patient is interested in med mgmt services and would like to be added to the wait list

## 2022-09-23 ENCOUNTER — APPOINTMENT (OUTPATIENT)
Dept: LAB | Facility: HOSPITAL | Age: 67
End: 2022-09-23
Payer: COMMERCIAL

## 2022-09-23 DIAGNOSIS — E03.9 HYPOTHYROIDISM, UNSPECIFIED TYPE: ICD-10-CM

## 2022-09-23 DIAGNOSIS — R97.20 ELEVATED PSA: ICD-10-CM

## 2022-09-23 PROCEDURE — G0103 PSA SCREENING: HCPCS

## 2022-09-23 PROCEDURE — 36415 COLL VENOUS BLD VENIPUNCTURE: CPT

## 2022-09-23 PROCEDURE — 86376 MICROSOMAL ANTIBODY EACH: CPT

## 2022-09-23 PROCEDURE — 86800 THYROGLOBULIN ANTIBODY: CPT

## 2022-09-24 LAB
PSA SERPL-MCNC: 3.9 NG/ML (ref 0–4)
THYROGLOB AB SERPL-ACNC: <1 IU/ML (ref 0–0.9)
THYROPEROXIDASE AB SERPL-ACNC: 10 IU/ML (ref 0–34)

## 2022-09-30 NOTE — TELEPHONE ENCOUNTER
Pt called in asking for a refill of his Cialis and said he takes a 20 mg tablet once a week but I didn't see that anywhere  Please advise on dosage and dispense amount  cup cup/spoon

## 2022-12-06 ENCOUNTER — PATIENT MESSAGE (OUTPATIENT)
Dept: UROLOGY | Facility: CLINIC | Age: 67
End: 2022-12-06

## 2022-12-06 DIAGNOSIS — R97.20 ELEVATED PSA: Primary | ICD-10-CM

## 2022-12-29 ENCOUNTER — TELEPHONE (OUTPATIENT)
Dept: UROLOGY | Facility: AMBULATORY SURGERY CENTER | Age: 67
End: 2022-12-29

## 2022-12-29 NOTE — TELEPHONE ENCOUNTER
Pt called stated starting yesterday he has had blood in urine with the color of brownish red  Pt is having some testicular pain for a few days   Pt stated he would like to be scheduled with Dr Patric Benavidez    Pt call [phone]

## 2022-12-29 NOTE — TELEPHONE ENCOUNTER
Called and spoke with patient reports brownish red urine, only once  Denies any discomfort when urinating  Reports increase in masturbation, now having tenderness  Denies burning, nausea/vomiting, Frequency or urgency  Appt scheduled

## 2023-01-04 ENCOUNTER — OFFICE VISIT (OUTPATIENT)
Dept: UROLOGY | Facility: CLINIC | Age: 68
End: 2023-01-04

## 2023-01-04 VITALS
WEIGHT: 163 LBS | BODY MASS INDEX: 22.08 KG/M2 | SYSTOLIC BLOOD PRESSURE: 120 MMHG | HEIGHT: 72 IN | DIASTOLIC BLOOD PRESSURE: 82 MMHG

## 2023-01-04 DIAGNOSIS — R31.0 GROSS HEMATURIA: ICD-10-CM

## 2023-01-04 DIAGNOSIS — N41.0 ACUTE PROSTATITIS: Primary | ICD-10-CM

## 2023-01-04 LAB
SL AMB  POCT GLUCOSE, UA: NORMAL
SL AMB LEUKOCYTE ESTERASE,UA: NORMAL
SL AMB POCT BILIRUBIN,UA: NORMAL
SL AMB POCT BLOOD,UA: NORMAL
SL AMB POCT CLARITY,UA: CLEAR
SL AMB POCT COLOR,UA: YELLOW
SL AMB POCT KETONES,UA: NORMAL
SL AMB POCT NITRITE,UA: NORMAL
SL AMB POCT PH,UA: 5
SL AMB POCT SPECIFIC GRAVITY,UA: 1.02
SL AMB POCT URINE PROTEIN: NORMAL
SL AMB POCT UROBILINOGEN: 0.2

## 2023-01-04 RX ORDER — LEVOFLOXACIN 500 MG/1
500 TABLET, FILM COATED ORAL EVERY 24 HOURS
Qty: 7 TABLET | Refills: 0 | Status: SHIPPED | OUTPATIENT
Start: 2023-01-04 | End: 2023-01-11

## 2023-01-04 RX ORDER — CLONAZEPAM 1 MG/1
TABLET ORAL
COMMUNITY
Start: 2023-01-03

## 2023-01-04 RX ORDER — QUETIAPINE FUMARATE 100 MG/1
300 TABLET, FILM COATED ORAL DAILY
COMMUNITY
Start: 2022-11-10

## 2023-01-04 RX ORDER — LURASIDONE HYDROCHLORIDE 60 MG/1
TABLET, FILM COATED ORAL
COMMUNITY
Start: 2022-10-31

## 2023-01-04 NOTE — PATIENT INSTRUCTIONS
Ibuprofen 600mg TID x 7 days  Rest from ejaculation  Donut pillow    Rx Levaquin 7 days (only if culture if positive, OR symptoms not improved w 3 days of ibuprofen)    How to Increase your Healthy Fluid Intake - A Urologist's Advice    Michelle Castro MD,PhD  [facility]  [phone]    Increasing your daily dietary fluid intake can be a simple solution to many urologic problems  You have been advised by your Urology team to increase her fluid intake  Here are some helpful pieces of advice and guidance    How much should I drink each day? A general goal for most healthy individuals is to consume 64 ounces daily  This equates to 2 Liters, or 8 glasses daily  Highly active and performance-oriented individuals may need more water  Those who experience certain type of incontinence, or with heart or kidney insufficiency, should consume less  What should I drink? WATER is the absolute best fluid available  In a sense, it is the only healthy you can drink  How can I tell if I am drinking enough water to keep my body healthy? Your urine should be clear -- every time your urinate! When your pee is yellow, you need more  Please note that multivitamins and some prescription medication can discolor the urine  What to avoid:  - Sugar-containing drinks  This includes gatorades, and even juice!    - Artificial Sweeteners - is often marketed as a more healthy alternative to sugar containing drinks  However, these chemicals are not at all healthy  Many contain chemicals that can exacerbate overactive bladder, and impair kidney and natural hormonal function   - Sodas  These hurt your urologic health in many ways  The acids and sugars/artificial sweeteners make their way into your urine, and can cause bladder overactivity  They cause your kidneys to temporarily make MORE URINE (more trips to the bathroom)    After consumption, they then result in dehydration - making your urine more concentrated and painful to pass  - Energy drinks - these are the worst!  We have seen kidney stones, horribly painful urinary conditions, and kidney and heart conditions caused entirely by these  Aside from these extreme conditions, the cycle of energy spikes and crashes is truly awful  Stay away! What about caffeine? Try to stick to 1 cup of coffee in the morning if able  Healthy diet and exercise will provide better and longer lasting energy in the afternoons, and avoid the cycle of energy spikes and crashes (and the harmful chemicals) found in energy drinks  Be sure to drink some water after your morning coffee to prevent dehydration    Tips or Hacks to help with fluid intake:  - Purchase a refillable water bottle  Carry this throughout your day  This helps tracks your daily consumption, and minimize waste  - if you tend to dislike the taste or sensation of drinking plain water, try using a bottle with a straw  This can help make increasing your fluid intake way easier! - Put a slice of fresh fruit in your water bottle  Anything that is in the fridge - lemon, lime, melons, apples, orange slice (or peel!)  Get some delicious fresh taste without any artificial chemicals or processed carbohydrates!

## 2023-01-04 NOTE — PROGRESS NOTES
Referring Physician: Kiersten Wooten DO  A copy of this note was sent to the referring physician  Diagnoses and all orders for this visit:    Gross hematuria  -     POCT urine dip    Other orders  -     Lurasidone HCl (Latuda) 60 MG TABS; 1 daily  -     QUEtiapine (SEROquel) 100 mg tablet; Take 300 mg by mouth daily  -     clonazePAM (KlonoPIN) 1 mg tablet            Assessment and plan:       1  History of elevated PSA  Lab Results   Component Value Date    PSA 3 9 09/23/2022    PSA 4 2 (H) 07/25/2022    PSA 5 0 (H) 04/25/2022       2  Negative multiparametric MRI  - MP MRI June 2022:  No detectable lesions, PI-RADS 2, 47 g gland    3 symptoms of acute nonbacterial prostatitis    Discussed NSAIDs, increased hydration and possible utility of antibiotic as standard of care management for prostatitis  I provided written instruction for 600 mg ibuprofen 3 times daily for the next 5 days  - Increase fluid hydration to 84 ounces of water daily  - I did prescribe Levaquin  I asked him to initiate this only if his culture results as positive or if his symptoms do not improve within 3 to 4 days of ibuprofen  - He was counseled things will likely take weeks to cool down  - He will complete his PSA in 6 months and follow-up with me next year      Adithya Randolph      Chief Complaint     Acute prostatitis      History of Present Illness     Azalea Richards is a 79 y o  male referred for 2nd opinion of elevated PSA, presenting as an acute visit with symptoms of prostatitis    Detailed Urologic History     - please refer to HPI    Review of Systems     Review of Systems   Constitutional: Negative for activity change and fatigue  HENT: Negative for congestion  Eyes: Negative for visual disturbance  Respiratory: Negative for shortness of breath and wheezing  Cardiovascular: Negative for chest pain and leg swelling  Gastrointestinal: Negative for abdominal pain  Endocrine: Negative for polyuria  Genitourinary: Negative for dysuria, flank pain, hematuria and urgency  Musculoskeletal: Negative for back pain  Allergic/Immunologic: Negative for immunocompromised state  Neurological: Negative for dizziness and numbness  Psychiatric/Behavioral: Negative for dysphoric mood  All other systems reviewed and are negative  AUA SYMPTOM SCORE    Flowsheet Row Most Recent Value   AUA SYMPTOM SCORE    How often have you had a sensation of not emptying your bladder completely after you finished urinating? 0 (P)     How often have you had to urinate again less than two hours after you finished urinating? 0 (P)     How often have you found you stopped and started again several times when you urinate? 0 (P)     How often have you found it difficult to postpone urination? 2 (P)     How often have you had a weak urinary stream? 0 (P)     How often have you had to push or strain to begin urination? 0 (P)     How many times did you most typically get up to urinate from the time you went to bed at night until the time you got up in the morning? 0 (P)     Quality of Life: If you were to spend the rest of your life with your urinary condition just the way it is now, how would you feel about that? 1 (P)     AUA SYMPTOM SCORE 2 (P)             Allergies     Allergies   Allergen Reactions   • Iodinated Contrast Media Other (See Comments)       Physical Exam     Physical Exam  Constitutional:       General: He is not in acute distress  Appearance: He is well-developed  HENT:      Head: Normocephalic and atraumatic  Cardiovascular:      Comments: Negative lower extremity edema  Pulmonary:      Effort: Pulmonary effort is normal       Breath sounds: Normal breath sounds  Abdominal:      Palpations: Abdomen is soft  Genitourinary:     Penis: Normal        Prostate: Normal    Musculoskeletal:         General: Normal range of motion  Cervical back: Normal range of motion  Skin:     General: Skin is warm  Neurological:      Mental Status: He is alert and oriented to person, place, and time  Psychiatric:         Behavior: Behavior normal              Vital Signs  Vitals:    01/04/23 1039   BP: 120/82   BP Location: Left arm   Patient Position: Sitting   Cuff Size: Adult   Weight: 73 9 kg (163 lb)   Height: 6' (1 829 m)         Current Medications       Current Outpatient Medications:   •  amphetamine-dextroamphetamine (ADDERALL) 10 mg tablet, Take 1 tablet by mouth in the morning and 1 tablet in the evening , Disp: , Rfl:   •  clonazePAM (KlonoPIN) 1 mg tablet, , Disp: , Rfl:   •  lamoTRIgine (LaMICtal) 200 MG tablet, Take 150 mg by mouth in the morning , Disp: , Rfl:   •  Lurasidone HCl (Latuda) 60 MG TABS, 1 daily, Disp: , Rfl:   •  mirtazapine (REMERON) 30 mg tablet, , Disp: , Rfl:   •  QUEtiapine (SEROquel) 100 mg tablet, Take 300 mg by mouth daily, Disp: , Rfl:   •  VIAGRA 50 MG tablet, , Disp: , Rfl: 3      Active Problems     Patient Active Problem List   Diagnosis   • Depression, recurrent ([facility] [address] )   • Hyperlipidemia   • Hyperglycemia         Past Medical History     Past Medical History:   Diagnosis Date   • BPH (benign prostatic hyperplasia)     resolved 11/11/2015         Surgical History     History reviewed  No pertinent surgical history        Family History     Family History   Problem Relation Age of Onset   • Lung cancer Father          Social History     Social History     Social History     Tobacco Use   Smoking Status Former   Smokeless Tobacco Former         Pertinent Lab Values     Lab Results   Component Value Date    CREATININE 1 24 06/09/2022       Lab Results   Component Value Date    PSA 3 9 09/23/2022    PSA 4 2 (H) 07/25/2022    PSA 5 0 (H) 04/25/2022       @RESULTRCNT(1H])@      Pertinent Imaging      - n/a    Portions of the record may have been created with voice recognition software   Occasional wrong word or "sound a like" substitutions may have occurred due to the inherent limitations of voice recognition software   Read the chart carefully and recognize, using context, where substitutions have occurred

## 2023-01-05 LAB — BACTERIA UR CULT: NORMAL

## 2023-01-16 ENCOUNTER — TELEPHONE (OUTPATIENT)
Dept: OTHER | Facility: OTHER | Age: 68
End: 2023-01-16

## 2023-01-16 NOTE — TELEPHONE ENCOUNTER
Patient called  States that he is taking Advil 600 mg, 3 times a day  Patient states he is feeling better  Patient would like to know how long he should continue taking medication or if he should decrease dosage  Would like a callback from the office

## 2023-01-19 DIAGNOSIS — N41.0 ACUTE PROSTATITIS: Primary | ICD-10-CM

## 2023-01-19 NOTE — TELEPHONE ENCOUNTER
Patient called in stating that he is having trouble with his mychart account  Patient states that he is experiencing discomfort and would like a call back to discuss with the doctor

## 2023-01-19 NOTE — TELEPHONE ENCOUNTER
Patient has stopped taking the Advil 600 MG TID  He is still having some discomfort, and would like to discuss taking a lesser dosage  He would also like to discuss if he should have a Creatinine test to check his levels  Concerns over liver function  Please contact Tracy Ramirez @ [phone]

## 2023-01-20 NOTE — TELEPHONE ENCOUNTER
Zackery Singh PA-C  to [facility]      11:48 AM   I called and answered patient's concerns  He will hold off on levaquin as his symptoms have improved with only mild lingering concerns  He will continue with hydration and minimizing bladder irritants  If symptoms persist or worsen after another week he was counseled to start the levaquin

## 2023-01-27 ENCOUNTER — TELEPHONE (OUTPATIENT)
Dept: UROLOGY | Facility: CLINIC | Age: 68
End: 2023-01-27

## 2023-01-27 NOTE — TELEPHONE ENCOUNTER
I called the patient to see how his prostatitis symptoms were doing  I missed a call from him on Friday  I called him on Wednesday, Thursday, and again this morning and reached his voicemail  I did leave him a message and asked him to call back to the office if he has any persistent concerns  If he does call back I am happy to find time to connect with him directly by phone

## 2023-01-28 NOTE — TELEPHONE ENCOUNTER
I tried returning the patient's call multiple times since yesterday and I keep getting his voicemail  I left him a message with my cell phone number if needed   Thanks

## 2023-03-13 ENCOUNTER — TELEPHONE (OUTPATIENT)
Dept: PSYCHIATRY | Facility: CLINIC | Age: 68
End: 2023-03-13

## 2023-03-13 NOTE — TELEPHONE ENCOUNTER
Sent Wait List Letter VIA [employer]   Verify patients need of services from wait list after 15 days   If no communication remove from Medication Management  wait list

## 2023-05-25 ENCOUNTER — RA CDI HCC (OUTPATIENT)
Dept: OTHER | Facility: HOSPITAL | Age: 68
End: 2023-05-25

## 2023-05-26 NOTE — PROGRESS NOTES
[facility]  coding opportunities       Chart reviewed, no opportunity found: CHART REVIEWED, Chuck Madden 1950      Patients Insurance     Medicare Insurance: [employer]

## 2023-06-12 ENCOUNTER — TELEPHONE (OUTPATIENT)
Dept: UROLOGY | Facility: AMBULATORY SURGERY CENTER | Age: 68
End: 2023-06-12

## 2023-06-12 NOTE — TELEPHONE ENCOUNTER
Patient left voicemail      Patient wanting to know if constipation and pushing a lot could cause his PSA to increase    He is requesting a call back at [phone]

## 2023-06-13 NOTE — TELEPHONE ENCOUNTER
CARRIE left requesting call back     When patient calls back please advise the act of straining to have a BM should not cause a rise in his PSA  If he is constipated and has an elevated PSA we can always recheck him

## 2023-09-01 ENCOUNTER — APPOINTMENT (OUTPATIENT)
Dept: LAB | Facility: HOSPITAL | Age: 68
End: 2023-09-01
Payer: COMMERCIAL

## 2023-09-01 DIAGNOSIS — R97.20 ELEVATED PSA: ICD-10-CM

## 2023-09-01 DIAGNOSIS — N41.0 ACUTE PROSTATITIS: ICD-10-CM

## 2023-09-01 LAB
ANION GAP SERPL CALCULATED.3IONS-SCNC: 5 MMOL/L
BACTERIA UR QL AUTO: ABNORMAL /HPF
BILIRUB UR QL STRIP: NEGATIVE
BUN SERPL-MCNC: 25 MG/DL (ref 5–25)
CALCIUM SERPL-MCNC: 9.2 MG/DL (ref 8.4–10.2)
CHLORIDE SERPL-SCNC: 102 MMOL/L (ref 96–108)
CLARITY UR: CLEAR
CO2 SERPL-SCNC: 29 MMOL/L (ref 21–32)
COLOR UR: ABNORMAL
CREAT SERPL-MCNC: 1.08 MG/DL (ref 0.6–1.3)
GFR SERPL CREATININE-BSD FRML MDRD: 70 ML/MIN/1.73SQ M
GLUCOSE SERPL-MCNC: 89 MG/DL (ref 65–140)
GLUCOSE UR STRIP-MCNC: NEGATIVE MG/DL
HGB UR QL STRIP.AUTO: NEGATIVE
KETONES UR STRIP-MCNC: NEGATIVE MG/DL
LEUKOCYTE ESTERASE UR QL STRIP: NEGATIVE
MUCOUS THREADS UR QL AUTO: ABNORMAL
NITRITE UR QL STRIP: NEGATIVE
NON-SQ EPI CELLS URNS QL MICRO: ABNORMAL /HPF
PH UR STRIP.AUTO: 5.5 [PH]
POTASSIUM SERPL-SCNC: 3.8 MMOL/L (ref 3.5–5.3)
PROT UR STRIP-MCNC: NEGATIVE MG/DL
PSA SERPL-MCNC: 2.07 NG/ML (ref 0–4)
RBC #/AREA URNS AUTO: ABNORMAL /HPF
SODIUM SERPL-SCNC: 136 MMOL/L (ref 135–147)
SP GR UR STRIP.AUTO: 1.01 (ref 1–1.03)
UROBILINOGEN UR STRIP-ACNC: <2 MG/DL
WBC #/AREA URNS AUTO: ABNORMAL /HPF

## 2023-09-01 PROCEDURE — 80048 BASIC METABOLIC PNL TOTAL CA: CPT

## 2023-09-01 PROCEDURE — 84153 ASSAY OF PSA TOTAL: CPT

## 2023-09-01 PROCEDURE — 87086 URINE CULTURE/COLONY COUNT: CPT

## 2023-09-01 PROCEDURE — 36415 COLL VENOUS BLD VENIPUNCTURE: CPT

## 2023-09-02 LAB — BACTERIA UR CULT: NORMAL

## 2023-09-06 ENCOUNTER — TELEPHONE (OUTPATIENT)
Dept: UROLOGY | Facility: CLINIC | Age: 68
End: 2023-09-06

## 2023-09-06 NOTE — TELEPHONE ENCOUNTER
----- Message from Sahil Candelario PA-C sent at 9/5/2023  7:48 AM EDT -----  Urine testing negative for infection

## 2023-09-06 NOTE — TELEPHONE ENCOUNTER
I called and left a message for patient to call back.       *WHEN PATIENT CALL BACK PLEASE RELAY MESSAGE:   Bridgette Hadley PA-C   9/5/2023 7:48 AM EDT Back to Top   Urine testing negative for infection

## 2023-09-07 ENCOUNTER — TELEPHONE (OUTPATIENT)
Dept: UROLOGY | Facility: CLINIC | Age: 68
End: 2023-09-07

## 2023-09-07 NOTE — TELEPHONE ENCOUNTER
VM left requesting call back    When patient calls back please relay to him Annette's response "PSA 2.09, previously 3. This is within normal limits at this time.  Thanks "

## 2023-11-06 ENCOUNTER — TELEPHONE (OUTPATIENT)
Dept: UROLOGY | Facility: CLINIC | Age: 68
End: 2023-11-06

## 2023-11-06 NOTE — TELEPHONE ENCOUNTER
User: Jelly Sloan Date/time: 11/1/23 9:13 AM   Comment: I called and LVM for patient to call back and schedule. Context: Lexi Recall Report Outcome:     Phone number: [phone] Phone Type: Mobile   Comm. type: Telephone Call type: Outgoing   Contact: Alejandrina Wayne Relation to patient: Self      User: Jelly Urrutiant Date/time: 10/31/23 12:57 PM   Comment: I called and LVM for patient to call back and schedule. Context: Lexi Recall Report Outcome:     Phone number: [phone] Phone Type: Mobile   Comm. type: Telephone Call type: Outgoing   Contact: Alejandrina Wayne Relation to patient: Self      User: Jelly Urrutiant Date/time: 9/7/23 12:36 PM   Comment: Patient will call back when he knows schedule- make sure he follows up   Context: Lexi Recall Report Outcome:     Phone number: [phone] Phone Type: Mobile   Comm. type: Telephone Call type: Outgoing   Contact: Alejandrina Wayne Relation to patient: Self     After several attempts to reach patient, I am mailing a certified letter.

## 2023-12-01 ENCOUNTER — TELEPHONE (OUTPATIENT)
Dept: INTERNAL MEDICINE CLINIC | Facility: CLINIC | Age: 68
End: 2023-12-01

## 2023-12-01 DIAGNOSIS — E03.9 HYPOTHYROIDISM, UNSPECIFIED TYPE: Primary | ICD-10-CM

## 2023-12-01 DIAGNOSIS — E78.5 HYPERLIPIDEMIA, UNSPECIFIED HYPERLIPIDEMIA TYPE: ICD-10-CM

## 2023-12-01 DIAGNOSIS — E55.9 VITAMIN D DEFICIENCY: ICD-10-CM

## 2023-12-01 NOTE — TELEPHONE ENCOUNTER
Dr Lindle Robson called and is asking if you can order his labs but psa not needed. I scheduled him for an appt with dr Stokes Later on 1/5/24. Thank you. He would like to go today.

## 2023-12-02 ENCOUNTER — APPOINTMENT (OUTPATIENT)
Dept: LAB | Facility: HOSPITAL | Age: 68
End: 2023-12-02
Payer: COMMERCIAL

## 2023-12-02 LAB
25(OH)D3 SERPL-MCNC: 14.1 NG/ML (ref 30–100)
ALBUMIN SERPL BCP-MCNC: 4.4 G/DL (ref 3.5–5)
ALP SERPL-CCNC: 45 U/L (ref 34–104)
ALT SERPL W P-5'-P-CCNC: 15 U/L (ref 7–52)
ANION GAP SERPL CALCULATED.3IONS-SCNC: 8 MMOL/L
AST SERPL W P-5'-P-CCNC: 16 U/L (ref 13–39)
BASOPHILS # BLD AUTO: 0.01 THOUSANDS/ÂΜL (ref 0–0.1)
BASOPHILS NFR BLD AUTO: 0 % (ref 0–1)
BILIRUB SERPL-MCNC: 0.74 MG/DL (ref 0.2–1)
BUN SERPL-MCNC: 17 MG/DL (ref 5–25)
CALCIUM SERPL-MCNC: 9.3 MG/DL (ref 8.4–10.2)
CHLORIDE SERPL-SCNC: 106 MMOL/L (ref 96–108)
CHOLEST SERPL-MCNC: 219 MG/DL
CO2 SERPL-SCNC: 28 MMOL/L (ref 21–32)
CREAT SERPL-MCNC: 1.17 MG/DL (ref 0.6–1.3)
EOSINOPHIL # BLD AUTO: 0.09 THOUSAND/ÂΜL (ref 0–0.61)
EOSINOPHIL NFR BLD AUTO: 2 % (ref 0–6)
ERYTHROCYTE [DISTWIDTH] IN BLOOD BY AUTOMATED COUNT: 12.5 % (ref 11.6–15.1)
GFR SERPL CREATININE-BSD FRML MDRD: 63 ML/MIN/1.73SQ M
GLUCOSE P FAST SERPL-MCNC: 100 MG/DL (ref 65–99)
HCT VFR BLD AUTO: 43 % (ref 36.5–49.3)
HDLC SERPL-MCNC: 37 MG/DL
HGB BLD-MCNC: 13.9 G/DL (ref 12–17)
IMM GRANULOCYTES # BLD AUTO: 0.02 THOUSAND/UL (ref 0–0.2)
IMM GRANULOCYTES NFR BLD AUTO: 1 % (ref 0–2)
LDLC SERPL CALC-MCNC: 159 MG/DL (ref 0–100)
LYMPHOCYTES # BLD AUTO: 1.11 THOUSANDS/ÂΜL (ref 0.6–4.47)
LYMPHOCYTES NFR BLD AUTO: 29 % (ref 14–44)
MCH RBC QN AUTO: 29.6 PG (ref 26.8–34.3)
MCHC RBC AUTO-ENTMCNC: 32.3 G/DL (ref 31.4–37.4)
MCV RBC AUTO: 92 FL (ref 82–98)
MONOCYTES # BLD AUTO: 0.43 THOUSAND/ÂΜL (ref 0.17–1.22)
MONOCYTES NFR BLD AUTO: 11 % (ref 4–12)
NEUTROPHILS # BLD AUTO: 2.14 THOUSANDS/ÂΜL (ref 1.85–7.62)
NEUTS SEG NFR BLD AUTO: 57 % (ref 43–75)
NRBC BLD AUTO-RTO: 0 /100 WBCS
PLATELET # BLD AUTO: 214 THOUSANDS/UL (ref 149–390)
PMV BLD AUTO: 9.5 FL (ref 8.9–12.7)
POTASSIUM SERPL-SCNC: 4.3 MMOL/L (ref 3.5–5.3)
PROT SERPL-MCNC: 6.9 G/DL (ref 6.4–8.4)
RBC # BLD AUTO: 4.69 MILLION/UL (ref 3.88–5.62)
SODIUM SERPL-SCNC: 142 MMOL/L (ref 135–147)
TRIGL SERPL-MCNC: 113 MG/DL
TSH SERPL DL<=0.05 MIU/L-ACNC: 2.3 UIU/ML (ref 0.45–4.5)
WBC # BLD AUTO: 3.8 THOUSAND/UL (ref 4.31–10.16)

## 2023-12-02 PROCEDURE — 80061 LIPID PANEL: CPT | Performed by: INTERNAL MEDICINE

## 2023-12-02 PROCEDURE — 36415 COLL VENOUS BLD VENIPUNCTURE: CPT | Performed by: INTERNAL MEDICINE

## 2023-12-02 PROCEDURE — 84443 ASSAY THYROID STIM HORMONE: CPT | Performed by: INTERNAL MEDICINE

## 2023-12-02 PROCEDURE — 85025 COMPLETE CBC W/AUTO DIFF WBC: CPT | Performed by: INTERNAL MEDICINE

## 2023-12-02 PROCEDURE — 80053 COMPREHEN METABOLIC PANEL: CPT | Performed by: INTERNAL MEDICINE

## 2023-12-02 PROCEDURE — 82306 VITAMIN D 25 HYDROXY: CPT | Performed by: INTERNAL MEDICINE

## 2023-12-06 ENCOUNTER — TELEPHONE (OUTPATIENT)
Dept: INTERNAL MEDICINE CLINIC | Facility: CLINIC | Age: 68
End: 2023-12-06

## 2023-12-06 DIAGNOSIS — R79.89 ABNORMAL CBC: Primary | ICD-10-CM

## 2023-12-06 NOTE — TELEPHONE ENCOUNTER
Hi this is Doctor Johnny Hendrickson first name Kelsi Galvin. Kelsi Galvin gave birth 1955. I wanted to know if I can have a repeat CBC because my WBC's were on the low side. Give me a call at [phone]. Again, [phone]. Doctor Elizabeth Smart put in the prescription for that right here. He left. So please give me a call. Thank you.

## 2023-12-29 ENCOUNTER — CLINICAL SUPPORT (OUTPATIENT)
Dept: INTERNAL MEDICINE CLINIC | Facility: CLINIC | Age: 68
End: 2023-12-29
Payer: COMMERCIAL

## 2023-12-29 DIAGNOSIS — Z23 ENCOUNTER FOR IMMUNIZATION: Primary | ICD-10-CM

## 2023-12-29 PROCEDURE — G0008 ADMIN INFLUENZA VIRUS VAC: HCPCS | Performed by: INTERNAL MEDICINE

## 2023-12-29 PROCEDURE — 90662 IIV NO PRSV INCREASED AG IM: CPT | Performed by: INTERNAL MEDICINE

## 2024-01-11 ENCOUNTER — TELEPHONE (OUTPATIENT)
Age: 69
End: 2024-01-11

## 2024-01-11 NOTE — TELEPHONE ENCOUNTER
Patient returned call.    Pt declined scheduling at this time.    Pt stated he will reach out to  directly and see what he can do.

## 2024-01-11 NOTE — TELEPHONE ENCOUNTER
Patient called today after receiving certified letter, to schedule next follow up appointment in [place].    Pt was offered the provider's next available date of 4/25 with Janneth.    I offered to add the patient to the wait list as well.    The patient declined the appointment due to work.    Pt states that in April he is available on the following dates: 4/8, 4/11, 4/22    Pt states that in May he is available on the following dates: 5/9/, 5/13, 5/21    Please, review and advise.    Call back [phone]

## 2024-01-11 NOTE — TELEPHONE ENCOUNTER
I called patient and left message asking pt to return my call to schedule next available appointment with Janneth.    If pt calls back inform patient that there is no availability on the dates that he specified. And please, offer next available.

## 2024-01-11 NOTE — TELEPHONE ENCOUNTER
Patient would like to talk to Dr. Lagunas. 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     Pt can be reached at [phone]  he doesn't want to wait until April.

## 2024-01-12 NOTE — TELEPHONE ENCOUNTER
CALLED PT FIGUEROA THAT [facility] HAS OPENINGS ON 2/14/23 THAT ARE AVAILABLE. IF HE TAKES ONE OF THOSE OPENINGS PLEASE LET CLINICAL KNOW SO THEY CAN ADD HIM SINCE IT IS HELD SPOTS.

## 2024-11-18 PROBLEM — R97.20 ELEVATED PSA: Status: ACTIVE | Noted: 2024-11-18

## 2024-11-18 PROBLEM — N41.0 ACUTE PROSTATITIS: Status: ACTIVE | Noted: 2024-11-18

## 2024-11-18 NOTE — PROGRESS NOTES
Referring Physician: Chris Hensley DO  A copy of this note was sent to the referring physician.       Diagnoses and all orders for this visit:    Acute prostatitis    Elevated PSA            Assessment and plan:       1.  Prostate Cancer Screening  Lab Results   Component Value Date    PSA 2.07 09/01/2023    PSA 3.9 09/23/2022    PSA 4.2 (H) 07/25/2022       2. History of elevated PSA, Negative multiparametric MRI  - MP MRI June 2022:  No detectable lesions, PI-RADS 2, 47 g gland    3 history of acute nonbacterial prostatitis    Guillermo is doing incredibly well.  Asymptomatic from a urinary perspective.  Today's digital rectal examination was negative.  He will complete his annual PSA in the near future.  I will follow-up on the results and notify him if there are any concerns.  Otherwise I will see him back next year with an additional PSA prior which I have ordered.      Jose Alfredo Lagunas      Chief Complaint     PSA follow-up       History of Present Illness     Remigio Savage is a 69 y.o. male returns in follow-up for prostate cancer screening, history of prostatitis and elevated PSA    Detailed Urologic History     - please refer to HPI    Review of Systems     Review of Systems   Constitutional:  Negative for activity change and fatigue.   HENT:  Negative for congestion.    Eyes:  Negative for visual disturbance.   Respiratory:  Negative for shortness of breath and wheezing.    Cardiovascular:  Negative for chest pain and leg swelling.   Gastrointestinal:  Negative for abdominal pain.   Endocrine: Negative for polyuria.   Genitourinary:  Negative for dysuria, flank pain, hematuria and urgency.   Musculoskeletal:  Negative for back pain.   Allergic/Immunologic: Negative for immunocompromised state.   Neurological:  Negative for dizziness and numbness.   Psychiatric/Behavioral:  Negative for dysphoric mood.    All other systems reviewed and are negative.      AUA SYMPTOM SCORE      Flowsheet Row Most Recent Value    AUA SYMPTOM SCORE    How often have you had a sensation of not emptying your bladder completely after you finished urinating? 0 (P)     How often have you had to urinate again less than two hours after you finished urinating? 0 (P)     How often have you found you stopped and started again several times when you urinate? 0 (P)     How often have you found it difficult to postpone urination? 2 (P)     How often have you had a weak urinary stream? 0 (P)     How often have you had to push or strain to begin urination? 0 (P)     How many times did you most typically get up to urinate from the time you went to bed at night until the time you got up in the morning? 0 (P)     Quality of Life: If you were to spend the rest of your life with your urinary condition just the way it is now, how would you feel about that? 1 (P)     AUA SYMPTOM SCORE 2 (P)               Allergies     Allergies   Allergen Reactions    Iodinated Contrast Media Other (See Comments)       Physical Exam     Physical Exam  Constitutional:       General: He is not in acute distress.     Appearance: He is well-developed.   HENT:      Head: Normocephalic and atraumatic.   Cardiovascular:      Comments: Negative lower extremity edema  Pulmonary:      Effort: Pulmonary effort is normal.      Breath sounds: Normal breath sounds.   Abdominal:      Palpations: Abdomen is soft.   Genitourinary:     Penis: Normal.       Prostate: Normal.   Musculoskeletal:         General: Normal range of motion.      Cervical back: Normal range of motion.   Skin:     General: Skin is warm.   Neurological:      Mental Status: He is alert and oriented to person, place, and time.   Psychiatric:         Behavior: Behavior normal.             Vital Signs  There were no vitals filed for this visit.        Current Medications       Current Outpatient Medications:     amphetamine-dextroamphetamine (ADDERALL) 10 mg tablet, Take 1 tablet by mouth in the morning and 1 tablet in the  "evening., Disp: , Rfl:     clonazePAM (KlonoPIN) 1 mg tablet, , Disp: , Rfl:     lamoTRIgine (LaMICtal) 200 MG tablet, Take 150 mg by mouth in the morning., Disp: , Rfl:     Lurasidone HCl (Latuda) 60 MG TABS, 1 daily, Disp: , Rfl:     mirtazapine (REMERON) 30 mg tablet, , Disp: , Rfl:     QUEtiapine (SEROquel) 100 mg tablet, Take 300 mg by mouth daily, Disp: , Rfl:     VIAGRA 50 MG tablet, , Disp: , Rfl: 3      Active Problems     Patient Active Problem List   Diagnosis    Depression, recurrent (HCC)    Hyperlipidemia    Hyperglycemia    Elevated PSA    Acute prostatitis         Past Medical History     Past Medical History:   Diagnosis Date    BPH (benign prostatic hyperplasia)     resolved 11/11/2015         Surgical History     No past surgical history on file.      Family History     Family History   Problem Relation Age of Onset    Lung cancer Father          Social History     Social History     Social History     Tobacco Use   Smoking Status Former   Smokeless Tobacco Former         Pertinent Lab Values     Lab Results   Component Value Date    CREATININE 1.17 12/02/2023       Lab Results   Component Value Date    PSA 2.07 09/01/2023    PSA 3.9 09/23/2022    PSA 4.2 (H) 07/25/2022       @RESULTRCNT(1H])@      Pertinent Imaging      - n/a    Portions of the record may have been created with voice recognition software.  Occasional wrong word or \"sound a like\" substitutions may have occurred due to the inherent limitations of voice recognition software.  Read the chart carefully and recognize, using context, where substitutions have occurred.  "

## 2024-11-19 ENCOUNTER — OFFICE VISIT (OUTPATIENT)
Dept: UROLOGY | Facility: CLINIC | Age: 69
End: 2024-11-19
Payer: COMMERCIAL

## 2024-11-19 VITALS
HEART RATE: 83 BPM | WEIGHT: 178.6 LBS | OXYGEN SATURATION: 99 % | SYSTOLIC BLOOD PRESSURE: 144 MMHG | BODY MASS INDEX: 24.19 KG/M2 | HEIGHT: 72 IN | DIASTOLIC BLOOD PRESSURE: 84 MMHG

## 2024-11-19 DIAGNOSIS — R97.20 ELEVATED PSA: ICD-10-CM

## 2024-11-19 DIAGNOSIS — N41.0 ACUTE PROSTATITIS: Primary | ICD-10-CM

## 2024-11-19 PROCEDURE — 99213 OFFICE O/P EST LOW 20 MIN: CPT | Performed by: UROLOGY

## 2024-11-19 RX ORDER — VENLAFAXINE HYDROCHLORIDE 150 MG/1
1 CAPSULE, EXTENDED RELEASE ORAL DAILY
COMMUNITY
Start: 2024-11-11

## 2025-04-27 ENCOUNTER — APPOINTMENT (OUTPATIENT)
Dept: LAB | Facility: HOSPITAL | Age: 70
End: 2025-04-27
Payer: COMMERCIAL

## 2025-04-27 DIAGNOSIS — R97.20 ELEVATED PSA: ICD-10-CM

## 2025-04-27 DIAGNOSIS — F33.2 SEVERE RECURRENT MAJOR DEPRESSION WITHOUT PSYCHOTIC FEATURES (HCC): ICD-10-CM

## 2025-04-27 DIAGNOSIS — N41.0 ACUTE PROSTATITIS: ICD-10-CM

## 2025-04-27 DIAGNOSIS — H57.89 EYE INFLAMMATION: ICD-10-CM

## 2025-04-27 LAB
ALBUMIN SERPL BCG-MCNC: 4.2 G/DL (ref 3.5–5)
ALP SERPL-CCNC: 59 U/L (ref 34–104)
ALT SERPL W P-5'-P-CCNC: 10 U/L (ref 7–52)
ANION GAP SERPL CALCULATED.3IONS-SCNC: 4 MMOL/L (ref 4–13)
AST SERPL W P-5'-P-CCNC: 16 U/L (ref 13–39)
BASOPHILS # BLD AUTO: 0.03 THOUSANDS/ÂΜL (ref 0–0.1)
BASOPHILS NFR BLD AUTO: 1 % (ref 0–1)
BILIRUB SERPL-MCNC: 0.57 MG/DL (ref 0.2–1)
BUN SERPL-MCNC: 18 MG/DL (ref 5–25)
CALCIUM SERPL-MCNC: 9.4 MG/DL (ref 8.4–10.2)
CHLORIDE SERPL-SCNC: 104 MMOL/L (ref 96–108)
CHOLEST SERPL-MCNC: 186 MG/DL (ref ?–200)
CO2 SERPL-SCNC: 32 MMOL/L (ref 21–32)
CREAT SERPL-MCNC: 1.17 MG/DL (ref 0.6–1.3)
CRP SERPL QL: 2 MG/L
EOSINOPHIL # BLD AUTO: 0.18 THOUSAND/ÂΜL (ref 0–0.61)
EOSINOPHIL NFR BLD AUTO: 4 % (ref 0–6)
ERYTHROCYTE [DISTWIDTH] IN BLOOD BY AUTOMATED COUNT: 12.6 % (ref 11.6–15.1)
ERYTHROCYTE [SEDIMENTATION RATE] IN BLOOD: 12 MM/HOUR (ref 0–19)
EST. AVERAGE GLUCOSE BLD GHB EST-MCNC: 117 MG/DL
GFR SERPL CREATININE-BSD FRML MDRD: 63 ML/MIN/1.73SQ M
GLUCOSE P FAST SERPL-MCNC: 110 MG/DL (ref 65–99)
HBA1C MFR BLD: 5.7 %
HCT VFR BLD AUTO: 43.4 % (ref 36.5–49.3)
HDLC SERPL-MCNC: 41 MG/DL
HGB BLD-MCNC: 14.1 G/DL (ref 12–17)
IMM GRANULOCYTES # BLD AUTO: 0.03 THOUSAND/UL (ref 0–0.2)
IMM GRANULOCYTES NFR BLD AUTO: 1 % (ref 0–2)
LDLC SERPL CALC-MCNC: 120 MG/DL (ref 0–100)
LYMPHOCYTES # BLD AUTO: 1.18 THOUSANDS/ÂΜL (ref 0.6–4.47)
LYMPHOCYTES NFR BLD AUTO: 23 % (ref 14–44)
MCH RBC QN AUTO: 30.5 PG (ref 26.8–34.3)
MCHC RBC AUTO-ENTMCNC: 32.5 G/DL (ref 31.4–37.4)
MCV RBC AUTO: 94 FL (ref 82–98)
MONOCYTES # BLD AUTO: 0.43 THOUSAND/ÂΜL (ref 0.17–1.22)
MONOCYTES NFR BLD AUTO: 8 % (ref 4–12)
NEUTROPHILS # BLD AUTO: 3.25 THOUSANDS/ÂΜL (ref 1.85–7.62)
NEUTS SEG NFR BLD AUTO: 63 % (ref 43–75)
NONHDLC SERPL-MCNC: 145 MG/DL
NRBC BLD AUTO-RTO: 0 /100 WBCS
PLATELET # BLD AUTO: 200 THOUSANDS/UL (ref 149–390)
PMV BLD AUTO: 9.5 FL (ref 8.9–12.7)
POTASSIUM SERPL-SCNC: 4.3 MMOL/L (ref 3.5–5.3)
PROT SERPL-MCNC: 6.8 G/DL (ref 6.4–8.4)
PSA SERPL-MCNC: 3.36 NG/ML (ref 0–4)
RBC # BLD AUTO: 4.62 MILLION/UL (ref 3.88–5.62)
SODIUM SERPL-SCNC: 140 MMOL/L (ref 135–147)
TRIGL SERPL-MCNC: 125 MG/DL (ref ?–150)
TSH SERPL DL<=0.05 MIU/L-ACNC: 4.01 UIU/ML (ref 0.45–4.5)
WBC # BLD AUTO: 5.1 THOUSAND/UL (ref 4.31–10.16)

## 2025-04-27 PROCEDURE — 85652 RBC SED RATE AUTOMATED: CPT

## 2025-04-27 PROCEDURE — 85025 COMPLETE CBC W/AUTO DIFF WBC: CPT

## 2025-04-27 PROCEDURE — 36415 COLL VENOUS BLD VENIPUNCTURE: CPT

## 2025-04-27 PROCEDURE — 84153 ASSAY OF PSA TOTAL: CPT

## 2025-04-27 PROCEDURE — 86140 C-REACTIVE PROTEIN: CPT

## 2025-04-27 PROCEDURE — 83036 HEMOGLOBIN GLYCOSYLATED A1C: CPT

## 2025-04-27 PROCEDURE — 80053 COMPREHEN METABOLIC PANEL: CPT

## 2025-04-27 PROCEDURE — 80175 DRUG SCREEN QUAN LAMOTRIGINE: CPT

## 2025-04-27 PROCEDURE — 84443 ASSAY THYROID STIM HORMONE: CPT

## 2025-04-27 PROCEDURE — 80061 LIPID PANEL: CPT

## 2025-04-29 LAB — LAMOTRIGINE SERPL-MCNC: 8.1 UG/ML (ref 2–20)

## 2025-05-08 ENCOUNTER — TELEPHONE (OUTPATIENT)
Dept: UROLOGY | Facility: CLINIC | Age: 70
End: 2025-05-08

## 2025-07-02 ENCOUNTER — VBI (OUTPATIENT)
Dept: ADMINISTRATIVE | Facility: OTHER | Age: 70
End: 2025-07-02

## 2025-07-02 NOTE — TELEPHONE ENCOUNTER
07/02/25 2:02 PM     Chart reviewed for CRC: Colonoscopy was/were submitted to the patient's insurance.     Chioma Sutherland   [facility]

## 2025-07-28 ENCOUNTER — TELEPHONE (OUTPATIENT)
Age: 70
End: 2025-07-28

## 2025-08-01 DIAGNOSIS — R97.20 ELEVATED PSA: Primary | ICD-10-CM
